# Patient Record
Sex: MALE | Race: WHITE | NOT HISPANIC OR LATINO
[De-identification: names, ages, dates, MRNs, and addresses within clinical notes are randomized per-mention and may not be internally consistent; named-entity substitution may affect disease eponyms.]

---

## 2017-03-31 ENCOUNTER — APPOINTMENT (OUTPATIENT)
Dept: CARDIOLOGY | Facility: CLINIC | Age: 37
End: 2017-03-31

## 2017-04-05 ENCOUNTER — OTHER (OUTPATIENT)
Age: 37
End: 2017-04-05

## 2017-04-25 ENCOUNTER — APPOINTMENT (OUTPATIENT)
Dept: CARDIOLOGY | Facility: CLINIC | Age: 37
End: 2017-04-25

## 2017-05-03 ENCOUNTER — NON-APPOINTMENT (OUTPATIENT)
Age: 37
End: 2017-05-03

## 2017-05-03 ENCOUNTER — APPOINTMENT (OUTPATIENT)
Dept: CARDIOLOGY | Facility: CLINIC | Age: 37
End: 2017-05-03

## 2017-05-03 VITALS
SYSTOLIC BLOOD PRESSURE: 133 MMHG | DIASTOLIC BLOOD PRESSURE: 81 MMHG | OXYGEN SATURATION: 97 % | WEIGHT: 167 LBS | HEART RATE: 67 BPM | HEIGHT: 67 IN | BODY MASS INDEX: 26.21 KG/M2

## 2017-05-03 DIAGNOSIS — R07.9 CHEST PAIN, UNSPECIFIED: ICD-10-CM

## 2017-05-09 ENCOUNTER — FORM ENCOUNTER (OUTPATIENT)
Age: 37
End: 2017-05-09

## 2017-05-10 ENCOUNTER — OUTPATIENT (OUTPATIENT)
Dept: OUTPATIENT SERVICES | Facility: HOSPITAL | Age: 37
LOS: 1 days | End: 2017-05-10
Payer: SELF-PAY

## 2017-05-10 ENCOUNTER — APPOINTMENT (OUTPATIENT)
Dept: CARDIOLOGY | Facility: CLINIC | Age: 37
End: 2017-05-10

## 2017-05-10 DIAGNOSIS — R07.9 CHEST PAIN, UNSPECIFIED: ICD-10-CM

## 2017-05-10 PROCEDURE — 75574 CT ANGIO HRT W/3D IMAGE: CPT | Mod: 26

## 2017-05-10 PROCEDURE — 75574 CT ANGIO HRT W/3D IMAGE: CPT

## 2017-07-28 ENCOUNTER — INPATIENT (INPATIENT)
Facility: HOSPITAL | Age: 37
LOS: 0 days | Discharge: HOME | DRG: 69 | End: 2017-07-29
Attending: INTERNAL MEDICINE | Admitting: INTERNAL MEDICINE
Payer: COMMERCIAL

## 2017-07-28 VITALS
WEIGHT: 160.06 LBS | TEMPERATURE: 98 F | OXYGEN SATURATION: 100 % | HEART RATE: 88 BPM | SYSTOLIC BLOOD PRESSURE: 132 MMHG | DIASTOLIC BLOOD PRESSURE: 82 MMHG | RESPIRATION RATE: 16 BRPM

## 2017-07-28 DIAGNOSIS — I63.9 CEREBRAL INFARCTION, UNSPECIFIED: ICD-10-CM

## 2017-07-28 LAB
ALBUMIN SERPL ELPH-MCNC: 4.5 G/DL — SIGNIFICANT CHANGE UP (ref 3.3–5)
ALP SERPL-CCNC: 77 U/L — SIGNIFICANT CHANGE UP (ref 40–120)
ALT FLD-CCNC: 118 U/L — HIGH (ref 12–78)
ANION GAP SERPL CALC-SCNC: 10 MMOL/L — SIGNIFICANT CHANGE UP (ref 5–17)
AST SERPL-CCNC: 219 U/L — HIGH (ref 15–37)
BASOPHILS # BLD AUTO: 0.1 K/UL — SIGNIFICANT CHANGE UP (ref 0–0.2)
BASOPHILS NFR BLD AUTO: 1.1 % — SIGNIFICANT CHANGE UP (ref 0–2)
BILIRUB SERPL-MCNC: 0.8 MG/DL — SIGNIFICANT CHANGE UP (ref 0.2–1.2)
BUN SERPL-MCNC: 16 MG/DL — SIGNIFICANT CHANGE UP (ref 7–23)
CALCIUM SERPL-MCNC: 9.2 MG/DL — SIGNIFICANT CHANGE UP (ref 8.5–10.1)
CHLORIDE SERPL-SCNC: 98 MMOL/L — SIGNIFICANT CHANGE UP (ref 96–108)
CO2 SERPL-SCNC: 28 MMOL/L — SIGNIFICANT CHANGE UP (ref 22–31)
CREAT SERPL-MCNC: 1 MG/DL — SIGNIFICANT CHANGE UP (ref 0.5–1.3)
EOSINOPHIL # BLD AUTO: 0.1 K/UL — SIGNIFICANT CHANGE UP (ref 0–0.5)
EOSINOPHIL NFR BLD AUTO: 1.6 % — SIGNIFICANT CHANGE UP (ref 0–6)
GLUCOSE SERPL-MCNC: 90 MG/DL — SIGNIFICANT CHANGE UP (ref 70–99)
HCT VFR BLD CALC: 46.2 % — SIGNIFICANT CHANGE UP (ref 39–50)
HGB BLD-MCNC: 15.5 G/DL — SIGNIFICANT CHANGE UP (ref 13–17)
LYMPHOCYTES # BLD AUTO: 1.3 K/UL — SIGNIFICANT CHANGE UP (ref 1–3.3)
LYMPHOCYTES # BLD AUTO: 25.6 % — SIGNIFICANT CHANGE UP (ref 13–44)
MCHC RBC-ENTMCNC: 31.1 PG — SIGNIFICANT CHANGE UP (ref 27–34)
MCHC RBC-ENTMCNC: 33.5 GM/DL — SIGNIFICANT CHANGE UP (ref 32–36)
MCV RBC AUTO: 92.9 FL — SIGNIFICANT CHANGE UP (ref 80–100)
MONOCYTES # BLD AUTO: 0.4 K/UL — SIGNIFICANT CHANGE UP (ref 0–0.9)
MONOCYTES NFR BLD AUTO: 7.1 % — SIGNIFICANT CHANGE UP (ref 1–9)
NEUTROPHILS # BLD AUTO: 3.3 K/UL — SIGNIFICANT CHANGE UP (ref 1.8–7.4)
NEUTROPHILS NFR BLD AUTO: 64.6 % — SIGNIFICANT CHANGE UP (ref 43–77)
PCP SPEC-MCNC: SIGNIFICANT CHANGE UP
PLATELET # BLD AUTO: 177 K/UL — SIGNIFICANT CHANGE UP (ref 150–400)
POTASSIUM SERPL-MCNC: 4 MMOL/L — SIGNIFICANT CHANGE UP (ref 3.5–5.3)
POTASSIUM SERPL-SCNC: 4 MMOL/L — SIGNIFICANT CHANGE UP (ref 3.5–5.3)
PROT SERPL-MCNC: 7.6 G/DL — SIGNIFICANT CHANGE UP (ref 6–8.3)
RBC # BLD: 4.98 M/UL — SIGNIFICANT CHANGE UP (ref 4.2–5.8)
RBC # FLD: 11.6 % — SIGNIFICANT CHANGE UP (ref 10.3–14.5)
SODIUM SERPL-SCNC: 136 MMOL/L — SIGNIFICANT CHANGE UP (ref 135–145)
TROPONIN I SERPL-MCNC: <.015 NG/ML — SIGNIFICANT CHANGE UP (ref 0.01–0.04)
WBC # BLD: 5.2 K/UL — SIGNIFICANT CHANGE UP (ref 3.8–10.5)
WBC # FLD AUTO: 5.2 K/UL — SIGNIFICANT CHANGE UP (ref 3.8–10.5)

## 2017-07-28 PROCEDURE — 70496 CT ANGIOGRAPHY HEAD: CPT | Mod: 26

## 2017-07-28 PROCEDURE — 99291 CRITICAL CARE FIRST HOUR: CPT

## 2017-07-28 PROCEDURE — 93880 EXTRACRANIAL BILAT STUDY: CPT | Mod: 26

## 2017-07-28 PROCEDURE — 93010 ELECTROCARDIOGRAM REPORT: CPT

## 2017-07-28 PROCEDURE — 70549 MR ANGIOGRAPH NECK W/O&W/DYE: CPT | Mod: 26

## 2017-07-28 PROCEDURE — 99285 EMERGENCY DEPT VISIT HI MDM: CPT

## 2017-07-28 PROCEDURE — 70551 MRI BRAIN STEM W/O DYE: CPT | Mod: 26

## 2017-07-28 PROCEDURE — 99223 1ST HOSP IP/OBS HIGH 75: CPT | Mod: AI

## 2017-07-28 RX ORDER — ACETAMINOPHEN 500 MG
650 TABLET ORAL EVERY 6 HOURS
Qty: 0 | Refills: 0 | Status: DISCONTINUED | OUTPATIENT
Start: 2017-07-28 | End: 2017-07-29

## 2017-07-28 RX ORDER — ASPIRIN/CALCIUM CARB/MAGNESIUM 324 MG
81 TABLET ORAL
Qty: 0 | Refills: 0 | Status: DISCONTINUED | OUTPATIENT
Start: 2017-07-29 | End: 2017-07-29

## 2017-07-28 RX ORDER — ALTEPLASE 100 MG
58.5 KIT INTRAVENOUS ONCE
Qty: 0 | Refills: 0 | Status: COMPLETED | OUTPATIENT
Start: 2017-07-28 | End: 2017-07-28

## 2017-07-28 RX ORDER — ACETAMINOPHEN 500 MG
650 TABLET ORAL ONCE
Qty: 0 | Refills: 0 | Status: COMPLETED | OUTPATIENT
Start: 2017-07-28 | End: 2017-07-28

## 2017-07-28 RX ORDER — ATORVASTATIN CALCIUM 80 MG/1
40 TABLET, FILM COATED ORAL AT BEDTIME
Qty: 0 | Refills: 0 | Status: DISCONTINUED | OUTPATIENT
Start: 2017-07-28 | End: 2017-07-29

## 2017-07-28 RX ORDER — ALTEPLASE 100 MG
6.5 KIT INTRAVENOUS ONCE
Qty: 0 | Refills: 0 | Status: COMPLETED | OUTPATIENT
Start: 2017-07-28 | End: 2017-07-28

## 2017-07-28 RX ADMIN — Medication 650 MILLIGRAM(S): at 11:27

## 2017-07-28 RX ADMIN — ATORVASTATIN CALCIUM 40 MILLIGRAM(S): 80 TABLET, FILM COATED ORAL at 21:49

## 2017-07-28 RX ADMIN — Medication 650 MILLIGRAM(S): at 16:33

## 2017-07-28 RX ADMIN — Medication 650 MILLIGRAM(S): at 17:30

## 2017-07-28 RX ADMIN — ALTEPLASE 58.5 MILLIGRAM(S): KIT at 10:10

## 2017-07-28 RX ADMIN — ALTEPLASE 390 MILLIGRAM(S): KIT at 10:09

## 2017-07-28 RX ADMIN — Medication 650 MILLIGRAM(S): at 12:21

## 2017-07-28 NOTE — PROGRESS NOTE ADULT - SUBJECTIVE AND OBJECTIVE BOX
BRIEF HOSPITAL COURSE: Patient is a 36y old  Male who presents with a chief complaint of Altered speech     ed (28 Jul 2017 11:50)    HPI:  37 yo white male with no sig PMHx with acute onset of altered speech shortly prior to evaluation in ED here. Was well and normal up until approximately 8:30 this morning. When he arrived at work, felt numbness of Rt side of face and RUE, followed by dysarthria. He was immediately brought to ED and evaluated by ED attending and Neurologist. CT head was neg for acute ICH/IVH and recieved tPA. All labs and tests reviewed. d/w family and Pt received tPA infusion.  Pt recently had full cardiac w/u at ? Dr. Elder's office. Was told all normal tests except thick heart (? LVH). (28 Jul 2017 11:50)      Events last 24 hours: ICU observation s/p tPA/stroke protocol. patient did well.    PAST MEDICAL & SURGICAL HISTORY:  No pertinent past medical history  No significant past surgical history      Review of Systems:  CONSTITUTIONAL: No fever, chills, or fatigue  EYES: No eye pain, visual disturbances, or discharge  ENMT:  No difficulty hearing, tinnitus, vertigo; No sinus or throat pain  NECK: No pain or stiffness  RESPIRATORY: No cough, wheezing, chills or hemoptysis; No shortness of breath  CARDIOVASCULAR: No chest pain, palpitations, dizziness, or leg swelling  GASTROINTESTINAL: No abdominal or epigastric pain. No nausea, vomiting, or hematemesis; No diarrhea or constipation. No melena or hematochezia.  GENITOURINARY: No dysuria, frequency, hematuria, or incontinence  NEUROLOGICAL: No headaches, memory loss, loss of strength, numbness, or tremors  SKIN: No itching, burning, rashes, or lesions   MUSCULOSKELETAL: No joint pain or swelling; No muscle, back, or extremity pain  PSYCHIATRIC: No depression, anxiety, mood swings, or difficulty sleeping      Medications:  acetaminophen   Tablet. 650 milliGRAM(s) Oral every 6 hours PRN  aspirin enteric coated 81 milliGRAM(s) Oral <User Schedule>  atorvastatin 40 milliGRAM(s) Oral at bedtime    ICU Vital Signs Last 24 Hrs  T(C): 36.7 (29 Jul 2017 04:01), Max: 37.1 (28 Jul 2017 20:00)  T(F): 98 (29 Jul 2017 04:01), Max: 98.7 (28 Jul 2017 20:00)  HR: 56 (29 Jul 2017 06:00) (56 - 88)  BP: 108/64 (29 Jul 2017 06:00) (93/54 - 138/72)  BP(mean): 81 (29 Jul 2017 06:00) (66 - 92)  ABP: --  ABP(mean): --  RR: 8 (29 Jul 2017 06:00) (8 - 40)  SpO2: 96% (29 Jul 2017 06:00) (96% - 100%)          I&O's Detail    28 Jul 2017 07:01  -  29 Jul 2017 07:00  --------------------------------------------------------  IN:  Total IN: 0 mL    OUT:    Voided: 1900 mL  Total OUT: 1900 mL    Total NET: -1900 mL    LABS:                        15.4   5.1   )-----------( 198      ( 29 Jul 2017 06:42 )             44.4     07-29    141  |  105  |  10  ----------------------------<  90  3.7   |  27  |  0.91    Ca    8.4<L>      29 Jul 2017 06:42  Mg     2.4     07-29    TPro  x   /  Alb  3.8  /  TBili  x   /  DBili  x   /  AST  x   /  ALT  x   /  AlkPhos  x   07-29      CARDIAC MARKERS ( 28 Jul 2017 09:53 )  <.015 ng/mL / x     / x     / x     / x          CULTURES:  Physical Examination:  General: No acute distress.  Alert, oriented, interactive, nonfocal neurologic exam. able to repeat, speech is clear and fluent, no motor drift.    HEENT: Pupils equal, reactive to light.  Symmetric, EOMI, no visual field deficit    PULM: Clear to auscultation bilaterally, no significant sputum production    CVS: Regular rate and rhythm, no murmurs, rubs, or gallops    ABD: Soft, nondistended, nontender, normoactive bowel sounds, no masses    EXT: No edema, nontender    SKIN: Warm and well perfused, no rashes noted.    RADIOLOGY: < from: MRI Head w/o Cont (07.28.17 @ 14:00) >    EXAM:  MRA NECK W & W O CONTRAST                          EXAM:  MRI BRAIN W O CONTRAST                            PROCEDURE DATE:  07/28/2017          INTERPRETATION:  .    CLINICAL INFORMATION: Acute aphasia. Cerebrovascular accident.    TECHNIQUE: Multiplanar multi sequential MRI examination of the brain was   performed without the administration of IV gadolinium. MRA images through   the neck were obtained using a combination of 2-D and 3-D time-of-flight   acquisition. Post contrast MR angiography of the neck was also performed.   The data was then reformatted into a volumetric data set and reviewed as   rotational MIP images. 7.5 cc's of IV Gadavist was administered without   immediate complication. 0 cc's was discarded.    COMPARISON: There are no comparison MRIs. Comparison is made to a prior   noncontrast head CT examination from earlier today. Comparison is also   made to a prior CT angiogram examination of the head from earlier today.    FINDINGS:    MRI Brain: The brain parenchyma is normal in signal and morphology. There   is no evidence of acute ischemia.    Ventricular size and configuration is unremarkable. Flow-voids are noted   throughout the major intracranial vessels, on the T2 weighted images,   consistent with their patency. The sella turcica and posterior fossa are   unremarkable.    The paranasal sinuses and mastoid air cells are clear. The orbits appear   unremarkable. Calvarial signal is within normal limits.    MRA Neck: There is an incidental bovine configuration to the aortic arch   which is an anatomic variation. The origins of the left subclavian artery   and right common trunk appear unremarkable. The bilateral common carotid   arteries are normal in course and caliber.    The bilateral carotid bifurcations appear unremarkable. The bilateral   cervical internal carotid arteries appear unremarkable.    The origins of the bilateral vertebral arteries are within normal limits.   The left cervical vertebral artery is dominant compared to the right. The   bilateral cervical vertebral arteries are normal in course and caliber.    IMPRESSION:    MRI BRAIN: No evidence of acute ischemia.    MRA neck: Unremarkable examination.    < end of copied text >    impression  TIA vs complicated migraine    neuro  continue Q1 hour neurologic assessment  complete resolution of symptoms  HA resolved  statin initiated  CT head negative  CTA head/neck negative  MR brain MRI/A head and neck all negative  carotid dopplers complete  urine toxicology screening negative    cardio  patient reports recent cardiovascular workup negative  goal SBP   continuous cardiac monitoring for 24h    pulm  goal O2 sats >93%  pulmonary toilet    GI  diet as tolerated  bowel regimen  mild transaminitis, trend and f/u as outpatient    renal  IVF x 24h then H/L  follow I/O  trend BUN/creat s/p IV contrast  mild hyponatremia    heme  begin chemoprophylaxis 24h after tPA administration  SCD's for DVT prophylaxis                    CRITICAL CARE TIME SPENT: 34 minutes

## 2017-07-28 NOTE — ED ADULT NURSE NOTE - ED STAT RN HANDOFF DETAILS 2
Patient now able to state full name and date of birth.  He is also able to state the name of the eyeglasses that he could not name when he arrived.

## 2017-07-28 NOTE — CONSULT NOTE ADULT - ATTENDING COMMENTS
35 yo M with prior Hx of chest pain but extensive cardiac evaluation negative, admitted this am with R arm and face paresthesias followed by expressive aphasia, concerning for CVA s/p tPA at 10am. Negative CTH, CTA, and MRI thus far.  DDx also includes atypical migraine or seizure.      --complete stroke eval with echo, carotid dopplars  check lipid panel, TFTs, tox screen  tele monitoring  clear by speech eval for regular diet  PT eval at 24h  start statin tonight and ASA tomorrow at noon  --HA beginning in ED, no evidence of hemorrhage on MRI, maybe atypical migraine, tylenol prn  --transaminitis, check hepatitis panel, trend  --no DVT ppx until 24h post tPA  --discussed plan with pt and family, Dr. Sim, Dr. Currie  --pt critically ill. CC time 40min

## 2017-07-28 NOTE — H&P ADULT - HISTORY OF PRESENT ILLNESS
35 yo white male with acute onset of altered speech shortly prior to evaluation in ED here. Was well and normal up until approximately 8:30 this morning. When he arrived at work, felt numbness of Rt side od face and Rt UE, followed by difficulty in speech. He was immediately brought to ED and evaluated by ED attending and Neurologist. CT head wad neg for any ac pathology and decision was made to give TPA. All labs and tests reviewed 37 yo white male with no sig PMHx with acute onset of altered speech shortly prior to evaluation in ED here. Was well and normal up until approximately 8:30 this morning. When he arrived at work, felt numbness of Rt side of face and Rt UE, followed by difficulty in speech. He was immediately brought to ED and evaluated by ED attending and Neurologist. CT head wad neg for any ac pathology and decision was made to give TPA. All labs and tests reviewed. d/w family and Pt received TPA infusion.  Pt recently had full cardiac w/u at ? Dr. Elder's office. Was told all normal tests except thick heart (? LVH).

## 2017-07-28 NOTE — CONSULT NOTE ADULT - ASSESSMENT
35 yo M with no sig PMHx with acute onset of altered speech, possible TIA vs seizure vs complex migraine. Negative cardiac work up as of 5/2017; Normal TTE, exercise stress and CT coronaries with a calcium score of zero. Doubt arrythmia as primary cause  Would repeat TTE to look for source of emboli, with bubble study  Monitor on tele  Neuro evaluation  Carotid doppler  Will follow with you.

## 2017-07-28 NOTE — SWALLOW BEDSIDE ASSESSMENT ADULT - COMMENTS
Pt A+Ox4, cooperative, admitted with CVA.  Pt's swallowing function is WNL and is safely tolerating regular consistencies with thin liquids without any overt s/s of aspiration.

## 2017-07-28 NOTE — CONSULT NOTE ADULT - SUBJECTIVE AND OBJECTIVE BOX
36 year old male with pmhx nonspecific chest pain with neg w/u thus far presents to ED this morning with c/o difficulty forming sentences and right sided arm and facial numbness. Patient reports going to the gym this morning when he started to experience worsening RUE numbness and tingling. Pt left gym to drive to work when he started having R facial numbness. When he arrived at work, his brother  pt was having difficulty speaking along with numbness and brought him to ED. In the ED, found to have expressive aphasia, able to report events but difficulty using appropriate nouns and naming basic objects such as eyeglasses/pen etc. VS were stable, normotensive. CT head negative for acute intracranial hemorrhage, mass effect, or midline shift. CTA negative for vessel occlusion. Labs significant for elevated LFTs, otherwise unremarkable. Urine tox pending.  He was evaluated be neurology and given tpa, symptom to needle time < 60min.   Of note, wife states pt has history of . Was evaluated by Dr. Phelan's group with negative workup. Patient is a 36y old  Male who presents with a chief complaint of expressive aphasia and RUE numbness    HPI: 36 year old male with pmhx nonspecific chest pain with neg coronary CTA and w/u thus far presents to ED this morning with c/o difficulty forming sentences and right sided arm and facial numbness. Patient reports going to the gym this morning when he started to experience worsening RUE numbness and tingling. Pt left gym to drive to work when he started having R facial numbness. When he arrived at work, his brother  pt was having difficulty speaking along with numbness and brought him to ED. In the ED, found to have expressive aphasia, able to report events but difficulty using appropriate nouns and naming basic objects such as eyeglasses/pen etc. Complains of frontal headache. Denies CP, SOB, abd pain, N/V/D/C, fever, chills, weakness.  etoh use, denies substance abuse, non smoker.  VS were stable, normotensive. CT head negative for acute intracranial hemorrhage, mass effect, or midline shift. CTA negative for vessel occlusion. Labs significant for elevated LFTs, otherwise unremarkable. Urine tox pending.  He was evaluated be neurology and given tpa, symptom to needle time < 60min.  Allergies: No Known Allergies    PAST MEDICAL & SURGICAL HISTORY:  No pertinent past medical history  No significant past surgical history    FAMILY HISTORY: Dad- CAD s/p PCI    HOME MEDICATIONS: None    REVIEW OF SYSTEMS  Constitutional: No fever, chills, fatigue  Neuro: + headache and numbness. No weakness  Resp: No cough, wheezing, shortness of breath  CVS: No chest pain, palpitations, leg swelling  GI: No abdominal pain, nausea, vomiting, diarrhea   : No dysuria, frequency, incontinence  Skin: No itching, burning, rashes, or lesions   Msk: No joint pain or swelling  Psych: No depression, anxiety, mood swings    T(F): 98.1 (07-28-17 @ 09:17), Max: 98.1 (07-28-17 @ 09:17)  HR: 74 (07-28-17 @ 11:40) (74 - 88)  BP: 120/60 (07-28-17 @ 11:40) (120/60 - 138/72)  RR: 14 (07-28-17 @ 11:40) (14 - 16)  SpO2: 99% (07-28-17 @ 11:40) (99% - 100%)      PHYSICAL EXAM  General: Well developed, well nourished, NAD  HEENT: NCAT, PERRLA, EOMI bl, moist mucous membranes   Neck: Supple, nontender, no mass  Neurology: A&Ox3, improving expressive aphasia, CN II-XII grossly intact, sensation intact  Respiratory: CTA B/L, No W/R/R  CV: RRR, +S1/S2, no murmurs, rubs or gallops  Abdominal: Soft, NT, ND +BSx4  Extremities: No C/C/E, + peripheral pulses  MSK: no joint erythema or warmth, no joint swelling   Skin: warm, dry, normal color, no rash or abnormal lesions    MEDICATIONS  alteplase    Bolus IV Bolus  alteplase    IVPB IV Intermittent       LABS                     15.5   5.2   )-----------( 177      ( 28 Jul 2017 09:53 )             46.2     07-28    136  |  98  |  16  ----------------------------<  90  4.0   |  28  |  1.00    Ca    9.2      28 Jul 2017 09:53  TPro  7.6  /  Alb  4.5  /  TBili  0.8  /  DBili  x   /  AST  219<H>  /  ALT  118<H>  /  AlkPhos  77  07-28  CARDIAC MARKERS ( 28 Jul 2017 09:53 )  <.015 ng/mL / x     / x     / x     / x          CODE STATUS: FULL  [facility] discussion: DAVINA Patient is a 36y old  Male who presents with a chief complaint of expressive aphasia and RUE numbness    HPI: 36 year old male with pmhx nonspecific chest pain with neg coronary CTA and w/u thus far presents to ED this morning with c/o difficulty forming sentences and right sided arm and facial numbness. Patient reports going to the gym this morning when he started to experience worsening RUE numbness and tingling. Pt left gym to drive to work when he started having R facial numbness. When he arrived at work, his brother  pt was having difficulty speaking along with numbness and brought him to ED. In the ED, found to have expressive aphasia, able to report events but difficulty using appropriate nouns and naming basic objects such as eyeglasses/pen etc. Complains of frontal headache. Denies CP, SOB, abd pain, N/V/D/C, fever, chills, weakness.  etoh use, denies substance abuse, non smoker.  VS were stable, normotensive. CT head negative for acute intracranial hemorrhage, mass effect, or midline shift. CTA negative for vessel occlusion. Labs significant for elevated LFTs, otherwise unremarkable. Urine tox pending.  He was evaluated be neurology and given tpa, symptom to needle time < 60min.  Allergies: No Known Allergies    PAST MEDICAL & SURGICAL HISTORY:  No pertinent past medical history  No significant past surgical history    FAMILY HISTORY: Dad- CAD s/p PCI    HOME MEDICATIONS: None    REVIEW OF SYSTEMS  Constitutional: No fever, chills, fatigue  Neuro: + headache and numbness. No weakness  Resp: No cough, wheezing, shortness of breath  CVS: No chest pain, palpitations, leg swelling  GI: No abdominal pain, nausea, vomiting, diarrhea   : No dysuria, frequency, incontinence  Skin: No itching, burning, rashes, or lesions   Msk: No joint pain or swelling  Psych: No depression, anxiety, mood swings    T(F): 98.1 (07-28-17 @ 09:17), Max: 98.1 (07-28-17 @ 09:17)  HR: 74 (07-28-17 @ 11:40) (74 - 88)  BP: 120/60 (07-28-17 @ 11:40) (120/60 - 138/72)  RR: 14 (07-28-17 @ 11:40) (14 - 16)  SpO2: 99% (07-28-17 @ 11:40) (99% - 100%)      PHYSICAL EXAM  General: Well developed, well nourished, NAD  HEENT: NCAT, PERRLA, EOMI bl, moist mucous membranes   Neck: Supple, nontender, no mass  Neurology: A&Ox3, improving expressive aphasia, CN II-XII grossly intact, mild decreased sensation in RUE, improving   Respiratory: CTA B/L, No W/R/R  CV: RRR, +S1/S2, no murmurs, rubs or gallops  Abdominal: Soft, NT, ND +BSx4  Extremities: No C/C/E, + peripheral pulses  MSK: no joint erythema or warmth, no joint swelling   Skin: warm, dry, normal color, no rash or abnormal lesions    MEDICATIONS  alteplase    Bolus IV Bolus  alteplase    IVPB IV Intermittent       LABS                     15.5   5.2   )-----------( 177      ( 28 Jul 2017 09:53 )             46.2     07-28    136  |  98  |  16  ----------------------------<  90  4.0   |  28  |  1.00    Ca    9.2      28 Jul 2017 09:53  TPro  7.6  /  Alb  4.5  /  TBili  0.8  /  DBili  x   /  AST  219<H>  /  ALT  118<H>  /  AlkPhos  77  07-28  CARDIAC MARKERS ( 28 Jul 2017 09:53 )  <.015 ng/mL / x     / x     / x     / x          CODE STATUS: FULL  [facility] discussion: Y Patient is a 36y old  Male who presents with a chief complaint of expressive aphasia and RUE numbness    HPI: 36 year old male with pmhx nonspecific chest pain with neg coronary CTA and w/u thus far presents to ED this morning with c/o difficulty forming sentences and right sided arm and facial numbness. Patient reports going to the gym this morning when he started to experience worsening RUE numbness and tingling. Pt left gym to drive to work when he started having R facial numbness. When he arrived at work, his brother  pt was having difficulty speaking along with numbness and brought him to ED. In the ED, found to have expressive aphasia, able to report events but difficulty using appropriate nouns and naming basic objects such as eyeglasses/pen etc. Complains of frontal headache. Denies CP, SOB, abd pain, N/V/D/C, fever, chills, weakness.  etoh use, denies substance abuse, non smoker.  VS were stable, normotensive. CT head negative for acute intracranial hemorrhage, mass effect, or midline shift. CTA negative for vessel occlusion. Labs significant for elevated LFTs, otherwise unremarkable. Urine tox pending.  He was evaluated be neurology and given tpa, symptom to needle time < 60min.  Allergies: No Known Allergies    PAST MEDICAL & SURGICAL HISTORY:  No pertinent past medical history  No significant past surgical history    FAMILY HISTORY: Dad- CAD s/p PCI    HOME MEDICATIONS: None    REVIEW OF SYSTEMS  Constitutional: No fever, chills, fatigue  Neuro: + headache and numbness. No weakness  Resp: No cough, wheezing, shortness of breath  CVS: No chest pain, palpitations, leg swelling  GI: No abdominal pain, nausea, vomiting, diarrhea   : No dysuria, frequency, incontinence  Skin: No itching, burning, rashes, or lesions   Msk: No joint pain or swelling  Psych: No depression, anxiety, mood swings    T(F): 98.1 (07-28-17 @ 09:17), Max: 98.1 (07-28-17 @ 09:17)  HR: 74 (07-28-17 @ 11:40) (74 - 88)  BP: 120/60 (07-28-17 @ 11:40) (120/60 - 138/72)  RR: 14 (07-28-17 @ 11:40) (14 - 16)  SpO2: 99% (07-28-17 @ 11:40) (99% - 100%)      PHYSICAL EXAM  General: Well developed, well nourished, NAD  HEENT: NCAT, PERRLA, EOMI bl, moist mucous membranes   Neck: Supple, nontender, no mass  Neurology: A&Ox3, improving expressive aphasia, CN II-XII grossly intact, mild decreased sensation in RUE, improving   Respiratory: CTA B/L, No W/R/R  CV: RRR, +S1/S2, no murmurs, rubs or gallops  Abdominal: Soft, NT, ND +BSx4  Extremities: No C/C/E, + peripheral pulses  MSK: no joint erythema or warmth, no joint swelling   Skin: warm, dry, normal color, no rash or abnormal lesions    MEDICATIONS  alteplase    Bolus IV Bolus  alteplase    IVPB IV Intermittent       LABS                     15.5   5.2   )-----------( 177      ( 28 Jul 2017 09:53 )             46.2     07-28    136  |  98  |  16  ----------------------------<  90  4.0   |  28  |  1.00    Ca    9.2      28 Jul 2017 09:53  TPro  7.6  /  Alb  4.5  /  TBili  0.8  /  DBili  x   /  AST  219<H>  /  ALT  118<H>  /  AlkPhos  77  07-28  CARDIAC MARKERS ( 28 Jul 2017 09:53 )  <.015 ng/mL / x     / x     / x     / x          CODE STATUS: FULL

## 2017-07-28 NOTE — STROKE CODE NOTE - NIH STROKE SCALE: 9. BEST LANGUAGE, QM
(1) Mild-to-moderate aphasia; some obvious loss of fluency or facility of comprehension, without significant limitation on ideas expressed or form of expression. Reduction of speech and/or comprehension, however, makes conversation about provided material difficult or impossible. For example, in conversation about provided materials, examiner can identify picture or naming card content from patient's response.
(2) Severe aphasia; all communication is through fragmentary expression; great need for inference, questioning, and guessing by the listener. Range of information that can be exchanged is limited; listener carries burden of communication. Examiner cannot identify materials provided from patient response.

## 2017-07-28 NOTE — ED ADULT NURSE NOTE - ED STAT RN HANDOFF DETAILS
6.5 mg IV push at 1009 by Dr. Portillo.  Infusion 58.5 mg. initiated at 1010 TPA 6.5 mg IV push at 1009 by Dr. Portillo.  Infusion TPA  58.5 mg. initiated at 1010

## 2017-07-28 NOTE — ED PROVIDER NOTE - OBJECTIVE STATEMENT
37 yo white male with acute onset of altered speech shortly prior to evaluation here. Was well and normal up until approximately 8:30 this morning.

## 2017-07-28 NOTE — PATIENT PROFILE ADULT. - FUNCTIONAL SCREEN CURRENT LEVEL: SWALLOWING (IF SCORE 2 OR MORE FOR ANY ITEM, CONSULT REHAB SERVICES), MLM)
(0) swallows foods/liquids without difficulty/NPO now; as per Rhonda Luis in ED, pt passed dysphagia screen; pt was drinking ginger ale while being wheeled to the ICU unit

## 2017-07-28 NOTE — H&P ADULT - PROBLEM SELECTOR PLAN 1
Pt was evaluated by Neuro Dr. Michele. and recommended TPA  Admit to ICU.  C/W IVF. No ASA, A/C for 24 hrs  Neuro check q 1 hr  s/s eval, Pt had some water in ED without difficulty.  Monitor vitals closely.  labs check Chol, TSH, urine tox  Echo.  cardio consult, and to get all results from his w/u

## 2017-07-28 NOTE — CONSULT NOTE ADULT - SUBJECTIVE AND OBJECTIVE BOX
[facility] - Morenita Elder, Tapan, Waleska, Arslan Kidd  Office Number: [phone]    Initial Consult Note    CHIEF COMPLAINT: Patient is a 36y old  Male who presents with a chief complaint of Altered speech     ed (28 Jul 2017 11:50)      HPI:  35 yo white male with no sig PMHx with acute onset of altered speech shortly prior to evaluation in ED here. Was well and normal up until approximately 8:30 this morning. When he arrived at work, felt numbness of Rt side of face and Rt UE, followed by difficulty in speech. He was immediately brought to ED and evaluated by ED attending and Neurologist. CT head wad neg for any ac pathology and decision was made to give TPA. All labs and tests reviewed. d/w family and Pt received TPA infusion.  Pt recently had full cardiac w/u at ? Dr. Elder's office. Was told all normal tests except thick heart (? LVH). (28 Jul 2017 11:50)    Of note. Normal TTE in 2017 with normal LV function. Exercise stress test with no EKG changes suspicious for ischemia. CT coronaries with a calcium score of 0. No chest pain or trouble breathing prior to the above events. Has history of vasovagal syncope in distant past. Previously on aspirin, but currently not on any medications      PAST MEDICAL & SURGICAL HISTORY:  No pertinent past medical history  No significant past surgical history      SOCIAL HISTORY:  No tobacco, ethanol, or drug abuse.    FAMILY HISTORY:  Family history of high cholesterol (Father)  Family history of coronary artery disease in father    No family history of acute MI or sudden cardiac death.    MEDICATIONS  (STANDING):  atorvastatin 40 milliGRAM(s) Oral at bedtime    MEDICATIONS  (PRN):  acetaminophen   Tablet. 650 milliGRAM(s) Oral every 6 hours PRN Mild Pain (1 - 3)      Allergies    No Known Allergies    Intolerances        REVIEW OF SYSTEMS:    CONSTITUTIONAL:+ weakness, fevers or chills  EYES/ENT: No visual changes;  No vertigo or throat pain   NECK: No pain or stiffness  RESPIRATORY: No cough, wheezing, hemoptysis; No shortness of breath  CARDIOVASCULAR: No chest pain or palpitations  GASTROINTESTINAL: No abdominal pain. No nausea, vomiting, or hematemesis; No diarrhea or constipation. No melena or hematochezia.  GENITOURINARY: No dysuria, frequency or hematuria  NEUROLOGICAL: + numbness and weakness  SKIN: No itching or rash  All other review of systems is negative unless indicated above    VITAL SIGNS:   Vital Signs Last 24 Hrs  T(C): 36.6 (28 Jul 2017 15:49), Max: 36.7 (28 Jul 2017 09:17)  T(F): 97.8 (28 Jul 2017 15:49), Max: 98.1 (28 Jul 2017 09:17)  HR: 71 (28 Jul 2017 13:15) (70 - 88)  BP: 117/66 (28 Jul 2017 13:15) (113/62 - 138/72)  BP(mean): 84 (28 Jul 2017 13:15) (81 - 92)  RR: 14 (28 Jul 2017 13:15) (13 - 17)  SpO2: 97% (28 Jul 2017 13:15) (96% - 100%)    I&O's Summary      On Exam:    Constitutional: NAD, alert and oriented x 3  Lungs:  Non-labored, breath sounds are clear bilaterally, No wheezing, rales or rhonchi  Cardiovascular: RRR.  S1 and S2 positive.  No murmurs, rubs, gallops or clicks  Gastrointestinal: Bowel Sounds present, soft, nontender.   Lymph: No peripheral edema. No cervical lymphadenopathy.  Neurological: Alert, no focal deficits  Skin: No rashes or ulcers   Psych:  Mood & affect appropriate.    LABS: All Labs Reviewed:                        15.5   5.2   )-----------( 177      ( 28 Jul 2017 09:53 )             46.2     28 Jul 2017 09:53    136    |  98     |  16     ----------------------------<  90     4.0     |  28     |  1.00     Ca    9.2        28 Jul 2017 09:53    TPro  7.6    /  Alb  4.5    /  TBili  0.8    /  DBili  x      /  AST  219    /  ALT  118    /  AlkPhos  77     28 Jul 2017 09:53      CARDIAC MARKERS ( 28 Jul 2017 09:53 )  <.015 ng/mL / x     / x     / x     / x          Blood Culture:         RADIOLOGY:    EKG: SR, no sign of ischemia    Tele: SR, no sign of ischemia

## 2017-07-28 NOTE — CONSULT NOTE ADULT - ASSESSMENT
36 year old male with pmhx nonspecific chest pain with neg coronary CTA and w/u thus far admitted with expressive aphasia and RUE numbness likely secondary to CVA with no acute hemorrhage on CT, s/p tpa administration, now in ICU for monitoring.     Neuro:  Cardio:  Resp:  GI:  :  Heme:  ID:  Endo: 36 year old male with pmhx nonspecific chest pain with neg coronary CTA and w/u thus far admitted with expressive aphasia and RUE numbness likely secondary to TIA, no acute hemorrhage on CT, s/p tpa administration, with transaminitis, now in ICU for monitoring.     Neuro: Likely sec to TIA, resolving s/p TPA administration.  MRI/MRA unremarkable. Utox pending.  F/u TTE, carotid dopplers. F/u lipid panel and thyroid studies in am. Allow for permissive HTN (SBP < 180 s/p TPA administration).  Start lipitor 40mg nightly. Will start asa 24 hours after tpa  and continue to monitor in the ICU overnight  Cardio:  Outpt coronary CTA and workup unremarkable from 5/17. Continue cardiac workup as above, stable  Resp: stable  GI: speech and swallow eval performed, start TLC diet. Transaminitis unknown etiology, f/u CMP in am, hep panel   : stable  Heme: s/p tpa   ID: stable  Endo: f/u TSH and Hgb A1c 36 year old male with pmhx nonspecific chest pain with neg coronary CTA and w/u thus far admitted with expressive aphasia and RUE numbness likely secondary to TIA, no acute hemorrhage on CT, s/p tpa administration, with transaminitis, now in ICU for monitoring.     Neuro: Likely sec to TIA, resolving s/p TPA administration.  MRI/MRA unremarkable. Utox pending.  F/u TTE, carotid dopplers. F/u lipid panel and thyroid studies in am. Allow for permissive HTN (SBP < 180 s/p TPA administration).  Start lipitor 40mg nightly. Will start asa 24 hours after tpa  and continue to monitor in the ICU overnight  Cardio:  Outpt coronary CTA and workup unremarkable from 5/17. Continue cardiac workup as above, stable  Resp: stable  GI: speech and swallow eval performed, start TLC diet. Transaminitis with increased AST:ALT, trend LFTs, f/u hep panel and ggt in am.   : stable  Heme: s/p tpa   ID: stable  Endo: f/u TSH and Hgb A1c

## 2017-07-28 NOTE — H&P ADULT - NSHPPHYSICALEXAM_GEN_ALL_CORE
PHYSICAL EXAM:  GENERAL: NAD, well-groomed, well-developed  HEAD:  Atraumatic, Normocephalic  EYES: EOMI, PERRLA, conjunctiva and sclera clear  ENMT: No tonsillar erythema, exudates, or enlargement; Moist mucous membranes,   NECK: Supple, No JVD, Normal thyroid  HEART: Regular rate and rhythm; No murmurs, rubs, or gallops  RESPIRATORY: CTA B/L, No W/R/R  ABDOMEN: Soft, Nontender, Nondistended; Bowel sounds present  NEUROLOGY: A&Ox3, nonfocal, CN II-XII grossly intact, moving all extremities, slight decreased sensation in Rt hand and forearm                  some difficulty in finding words  EXTREMITIES:  2+ Peripheral Pulses, No clubbing, cyanosis, or edema  SKIN: warm, dry, normal color, no rash or abnormal lesions

## 2017-07-28 NOTE — H&P ADULT - FAMILY HISTORY
<<-----Click on this checkbox to enter Family History Family history of coronary artery disease in father     Father  Still living? Yes, Estimated age: Age Unknown  Family history of high cholesterol, Age at diagnosis: Age Unknown

## 2017-07-29 ENCOUNTER — TRANSCRIPTION ENCOUNTER (OUTPATIENT)
Age: 37
End: 2017-07-29

## 2017-07-29 VITALS
DIASTOLIC BLOOD PRESSURE: 58 MMHG | SYSTOLIC BLOOD PRESSURE: 110 MMHG | OXYGEN SATURATION: 97 % | HEART RATE: 65 BPM | RESPIRATION RATE: 20 BRPM

## 2017-07-29 DIAGNOSIS — G43.909 MIGRAINE, UNSPECIFIED, NOT INTRACTABLE, WITHOUT STATUS MIGRAINOSUS: ICD-10-CM

## 2017-07-29 DIAGNOSIS — G45.9 TRANSIENT CEREBRAL ISCHEMIC ATTACK, UNSPECIFIED: ICD-10-CM

## 2017-07-29 LAB
ALBUMIN SERPL ELPH-MCNC: 3.8 G/DL — SIGNIFICANT CHANGE UP (ref 3.3–5)
ALP SERPL-CCNC: 67 U/L — SIGNIFICANT CHANGE UP (ref 40–120)
ALT FLD-CCNC: 85 U/L — HIGH (ref 12–78)
ANION GAP SERPL CALC-SCNC: 9 MMOL/L — SIGNIFICANT CHANGE UP (ref 5–17)
AST SERPL-CCNC: 111 U/L — HIGH (ref 15–37)
BASOPHILS # BLD AUTO: 0 K/UL — SIGNIFICANT CHANGE UP (ref 0–0.2)
BASOPHILS NFR BLD AUTO: 0.7 % — SIGNIFICANT CHANGE UP (ref 0–2)
BILIRUB SERPL-MCNC: 0.8 MG/DL — SIGNIFICANT CHANGE UP (ref 0.2–1.2)
BUN SERPL-MCNC: 10 MG/DL — SIGNIFICANT CHANGE UP (ref 7–23)
CALCIUM SERPL-MCNC: 8.4 MG/DL — LOW (ref 8.5–10.1)
CHLORIDE SERPL-SCNC: 105 MMOL/L — SIGNIFICANT CHANGE UP (ref 96–108)
CHOLEST SERPL-MCNC: 216 MG/DL — HIGH (ref 10–199)
CO2 SERPL-SCNC: 27 MMOL/L — SIGNIFICANT CHANGE UP (ref 22–31)
CREAT SERPL-MCNC: 0.91 MG/DL — SIGNIFICANT CHANGE UP (ref 0.5–1.3)
EOSINOPHIL # BLD AUTO: 0.1 K/UL — SIGNIFICANT CHANGE UP (ref 0–0.5)
EOSINOPHIL NFR BLD AUTO: 2.8 % — SIGNIFICANT CHANGE UP (ref 0–6)
GGT SERPL-CCNC: 21 U/L — SIGNIFICANT CHANGE UP (ref 9–50)
GLUCOSE SERPL-MCNC: 90 MG/DL — SIGNIFICANT CHANGE UP (ref 70–99)
HAV IGM SER-ACNC: SIGNIFICANT CHANGE UP
HBV CORE IGM SER-ACNC: SIGNIFICANT CHANGE UP
HBV SURFACE AG SER-ACNC: SIGNIFICANT CHANGE UP
HCT VFR BLD CALC: 44.4 % — SIGNIFICANT CHANGE UP (ref 39–50)
HCV AB S/CO SERPL IA: 0.11 S/CO — SIGNIFICANT CHANGE UP
HCV AB SERPL-IMP: SIGNIFICANT CHANGE UP
HDLC SERPL-MCNC: 62 MG/DL — SIGNIFICANT CHANGE UP (ref 40–125)
HGB BLD-MCNC: 15.4 G/DL — SIGNIFICANT CHANGE UP (ref 13–17)
LIPID PNL WITH DIRECT LDL SERPL: 139 MG/DL — HIGH
LYMPHOCYTES # BLD AUTO: 1.6 K/UL — SIGNIFICANT CHANGE UP (ref 1–3.3)
LYMPHOCYTES # BLD AUTO: 31 % — SIGNIFICANT CHANGE UP (ref 13–44)
MAGNESIUM SERPL-MCNC: 2.4 MG/DL — SIGNIFICANT CHANGE UP (ref 1.6–2.6)
MCHC RBC-ENTMCNC: 32.2 PG — SIGNIFICANT CHANGE UP (ref 27–34)
MCHC RBC-ENTMCNC: 34.6 GM/DL — SIGNIFICANT CHANGE UP (ref 32–36)
MCV RBC AUTO: 93.1 FL — SIGNIFICANT CHANGE UP (ref 80–100)
MONOCYTES # BLD AUTO: 0.4 K/UL — SIGNIFICANT CHANGE UP (ref 0–0.9)
MONOCYTES NFR BLD AUTO: 7.9 % — SIGNIFICANT CHANGE UP (ref 1–9)
NEUTROPHILS # BLD AUTO: 2.9 K/UL — SIGNIFICANT CHANGE UP (ref 1.8–7.4)
NEUTROPHILS NFR BLD AUTO: 57.6 % — SIGNIFICANT CHANGE UP (ref 43–77)
PHOSPHATE SERPL-MCNC: 3.4 MG/DL — SIGNIFICANT CHANGE UP (ref 2.5–4.5)
PLATELET # BLD AUTO: 198 K/UL — SIGNIFICANT CHANGE UP (ref 150–400)
POTASSIUM SERPL-MCNC: 3.7 MMOL/L — SIGNIFICANT CHANGE UP (ref 3.5–5.3)
POTASSIUM SERPL-SCNC: 3.7 MMOL/L — SIGNIFICANT CHANGE UP (ref 3.5–5.3)
PROT SERPL-MCNC: 6.7 G/DL — SIGNIFICANT CHANGE UP (ref 6–8.3)
RBC # BLD: 4.78 M/UL — SIGNIFICANT CHANGE UP (ref 4.2–5.8)
RBC # FLD: 11.4 % — SIGNIFICANT CHANGE UP (ref 10.3–14.5)
SODIUM SERPL-SCNC: 141 MMOL/L — SIGNIFICANT CHANGE UP (ref 135–145)
T3FREE SERPL-MCNC: 2.72 PG/ML — SIGNIFICANT CHANGE UP (ref 1.8–4.6)
T4 FREE SERPL-MCNC: 1.3 NG/DL — SIGNIFICANT CHANGE UP (ref 0.9–1.8)
TOTAL CHOLESTEROL/HDL RATIO MEASUREMENT: 3.5 RATIO — SIGNIFICANT CHANGE UP (ref 3.4–9.6)
TRIGL SERPL-MCNC: 77 MG/DL — SIGNIFICANT CHANGE UP (ref 10–149)
TSH SERPL-MCNC: 1.5 UIU/ML — SIGNIFICANT CHANGE UP (ref 0.36–3.74)
WBC # BLD: 5.1 K/UL — SIGNIFICANT CHANGE UP (ref 3.8–10.5)
WBC # FLD AUTO: 5.1 K/UL — SIGNIFICANT CHANGE UP (ref 3.8–10.5)

## 2017-07-29 PROCEDURE — 93005 ELECTROCARDIOGRAM TRACING: CPT

## 2017-07-29 PROCEDURE — 80074 ACUTE HEPATITIS PANEL: CPT

## 2017-07-29 PROCEDURE — 84439 ASSAY OF FREE THYROXINE: CPT

## 2017-07-29 PROCEDURE — 83735 ASSAY OF MAGNESIUM: CPT

## 2017-07-29 PROCEDURE — 85027 COMPLETE CBC AUTOMATED: CPT

## 2017-07-29 PROCEDURE — 70450 CT HEAD/BRAIN W/O DYE: CPT

## 2017-07-29 PROCEDURE — 99233 SBSQ HOSP IP/OBS HIGH 50: CPT

## 2017-07-29 PROCEDURE — 70496 CT ANGIOGRAPHY HEAD: CPT

## 2017-07-29 PROCEDURE — 70551 MRI BRAIN STEM W/O DYE: CPT

## 2017-07-29 PROCEDURE — 99285 EMERGENCY DEPT VISIT HI MDM: CPT | Mod: 25

## 2017-07-29 PROCEDURE — A9579: CPT

## 2017-07-29 PROCEDURE — 96365 THER/PROPH/DIAG IV INF INIT: CPT

## 2017-07-29 PROCEDURE — 99239 HOSP IP/OBS DSCHRG MGMT >30: CPT

## 2017-07-29 PROCEDURE — 80061 LIPID PANEL: CPT

## 2017-07-29 PROCEDURE — 80053 COMPREHEN METABOLIC PANEL: CPT

## 2017-07-29 PROCEDURE — 93306 TTE W/DOPPLER COMPLETE: CPT

## 2017-07-29 PROCEDURE — 93880 EXTRACRANIAL BILAT STUDY: CPT

## 2017-07-29 PROCEDURE — 84484 ASSAY OF TROPONIN QUANT: CPT

## 2017-07-29 PROCEDURE — 84443 ASSAY THYROID STIM HORMONE: CPT

## 2017-07-29 PROCEDURE — 99291 CRITICAL CARE FIRST HOUR: CPT

## 2017-07-29 PROCEDURE — 70549 MR ANGIOGRAPH NECK W/O&W/DYE: CPT

## 2017-07-29 PROCEDURE — 97161 PT EVAL LOW COMPLEX 20 MIN: CPT

## 2017-07-29 PROCEDURE — 84100 ASSAY OF PHOSPHORUS: CPT

## 2017-07-29 PROCEDURE — 93306 TTE W/DOPPLER COMPLETE: CPT | Mod: 26

## 2017-07-29 PROCEDURE — 84481 FREE ASSAY (FT-3): CPT

## 2017-07-29 PROCEDURE — 80307 DRUG TEST PRSMV CHEM ANLYZR: CPT

## 2017-07-29 PROCEDURE — 82977 ASSAY OF GGT: CPT

## 2017-07-29 RX ORDER — ASPIRIN/CALCIUM CARB/MAGNESIUM 324 MG
1 TABLET ORAL
Qty: 14 | Refills: 0 | OUTPATIENT
Start: 2017-07-29 | End: 2017-08-12

## 2017-07-29 RX ADMIN — Medication 650 MILLIGRAM(S): at 12:40

## 2017-07-29 RX ADMIN — Medication 650 MILLIGRAM(S): at 11:51

## 2017-07-29 RX ADMIN — Medication 81 MILLIGRAM(S): at 11:52

## 2017-07-29 NOTE — DISCHARGE NOTE ADULT - PATIENT PORTAL LINK FT
“You can access the FollowHealth Patient Portal, offered by [facility], by registering with the following website: [email or web address]”

## 2017-07-29 NOTE — DISCHARGE NOTE ADULT - CARE PLAN
Principal Discharge DX:	TIA (transient ischemic attack)  Goal:	versus complicated Migraine, cont  mg for 2 weeks then 81 mg daily, f/u with Dr matute for hypercoagulable work up.  Instructions for follow-up, activity and diet:	low cholestrol diet.  Secondary Diagnosis:	HLD (hyperlipidemia)  Goal:	as above , recheck LFT in 1 week by pcp if back to normal  start statin  Secondary Diagnosis:	LFT elevation  Goal:	recheck LFT in 1 week by pcp.

## 2017-07-29 NOTE — PHYSICAL THERAPY INITIAL EVALUATION ADULT - ANKLE STRATEGY ASSESSMENT, REHAB EVAL
pt just reports a mild "hangover" sensation when ambulating but no other deficits or c/o. Pt reports sensation in tact

## 2017-07-29 NOTE — PROGRESS NOTE ADULT - ASSESSMENT
37 yo M with no sig PMHx with acute onset of altered speech, possible TIA vs seizure vs complex migraine. Negative cardiac work up as of 5/2017; Normal TTE, exercise stress and CT coronaries with a calcium score of zero. Doubt arrythmia as primary cause  Would repeat TTE to look for source of emboli, with bubble study  Monitor on tele  Neuro follow up, evaluation appreciated  Carotid doppler with no abnormalities  MRI with no evidence of acute stroke  Will follow with you.

## 2017-07-29 NOTE — PROGRESS NOTE ADULT - SUBJECTIVE AND OBJECTIVE BOX
[facility] - Morenita Elder, Tapan, Waleska, Bernabe, Rosey Mcdaniels  Office Number:  [phone]    Patient resting comfortably in bed in NAD.  Laying flat with no respiratory distress.  No complaints of chest pain, dyspnea, palpitations, PND, or orthopnea. Neurologic symptoms have resolved.    ROS: negative unless otherwise mentioned.    Telemetry:      MEDICATIONS  (STANDING):  atorvastatin 40 milliGRAM(s) Oral at bedtime  aspirin enteric coated 81 milliGRAM(s) Oral <User Schedule>    MEDICATIONS  (PRN):  acetaminophen   Tablet. 650 milliGRAM(s) Oral every 6 hours PRN Mild Pain (1 - 3)      Allergies    No Known Allergies    Intolerances        Vital Signs Last 24 Hrs  T(C): 36.9 (29 Jul 2017 08:00), Max: 37.1 (28 Jul 2017 20:00)  T(F): 98.4 (29 Jul 2017 08:00), Max: 98.7 (28 Jul 2017 20:00)  HR: 69 (29 Jul 2017 09:00) (56 - 86)  BP: 110/65 (29 Jul 2017 09:00) (93/54 - 138/72)  BP(mean): 83 (29 Jul 2017 09:00) (66 - 92)  RR: 22 (29 Jul 2017 09:00) (8 - 40)  SpO2: 99% (29 Jul 2017 09:00) (96% - 100%)    I&O's Summary    28 Jul 2017 07:01  -  29 Jul 2017 07:00  --------------------------------------------------------  IN: 0 mL / OUT: 1900 mL / NET: -1900 mL    29 Jul 2017 07:01  -  29 Jul 2017 10:04  --------------------------------------------------------  IN: 0 mL / OUT: 400 mL / NET: -400 mL        ON EXAM:    General: NAD, awake and alert, oriented x 3  HEENT: Mucous membranes are moist, anicteric  Lungs: Non-labored, breath sounds are clear bilaterally, No wheezing, rales or rhonchi  Cardiovascular: Regular, S1 and S2, no murmurs, rubs, or gallops  Gastrointestinal: Bowel Sounds present, soft, nontender.   Lymph: No peripheral edema. No lymphadenopathy.  Skin: No rashes or ulcers  Psych:  Mood & affect appropriate    LABS: All Labs Reviewed:                        15.4   5.1   )-----------( 198      ( 29 Jul 2017 06:42 )             44.4                         15.5   5.2   )-----------( 177      ( 28 Jul 2017 09:53 )             46.2     29 Jul 2017 06:42    141    |  105    |  10     ----------------------------<  90     3.7     |  27     |  0.91   28 Jul 2017 09:53    136    |  98     |  16     ----------------------------<  90     4.0     |  28     |  1.00     Ca    8.4        29 Jul 2017 06:42  Ca    9.2        28 Jul 2017 09:53  Phos  3.4       29 Jul 2017 06:42  Mg     2.4       29 Jul 2017 06:42    TPro  6.7    /  Alb  3.8    /  TBili  0.8    /  DBili  x      /  AST  111    /  ALT  85     /  AlkPhos  67     29 Jul 2017 06:42  TPro  7.6    /  Alb  4.5    /  TBili  0.8    /  DBili  x      /  AST  219    /  ALT  118    /  AlkPhos  77     28 Jul 2017 09:53      CARDIAC MARKERS ( 28 Jul 2017 09:53 )  <.015 ng/mL / x     / x     / x     / x          Blood Culture:     07-29 @ 06:42  TSH: 1.50

## 2017-07-29 NOTE — DISCHARGE NOTE ADULT - NS AS DC STROKE ED MATERIALS
Stroke Education Booklet/Need for Followup After Discharge/Call 911 for Stroke/Prescribed Medications/Risk Factors for Stroke/Stroke Warning Signs and Symptoms

## 2017-07-29 NOTE — DISCHARGE NOTE ADULT - MEDICATION SUMMARY - MEDICATIONS TO TAKE
I will START or STAY ON the medications listed below when I get home from the hospital:    aspirin 325 mg oral tablet  -- 1 tab(s) by mouth once a day then change to 81 mg daily  -- Take with food or milk.    -- Indication: For Cerebrovascular accident (CVA), unspecified mechanism

## 2017-07-29 NOTE — PROGRESS NOTE ADULT - SUBJECTIVE AND OBJECTIVE BOX
Neurology follow up note  COVERING DR STEPHIE AMOR [id number]      Interval History:    Patient feels ok no new complaints. seen with family     MEDICATIONS    atorvastatin 40 milliGRAM(s) Oral at bedtime  aspirin enteric coated 81 milliGRAM(s) Oral <User Schedule>  acetaminophen   Tablet. 650 milliGRAM(s) Oral every 6 hours PRN      Allergies    No Known Allergies    Intolerances            Vital Signs Last 24 Hrs  T(C): 36.3 (29 Jul 2017 12:00), Max: 37.1 (28 Jul 2017 20:00)  T(F): 97.4 (29 Jul 2017 12:00), Max: 98.7 (28 Jul 2017 20:00)  HR: 66 (29 Jul 2017 13:00) (56 - 86)  BP: 103/70 (29 Jul 2017 13:00) (93/54 - 121/59)  BP(mean): 83 (29 Jul 2017 13:00) (66 - 88)  RR: 18 (29 Jul 2017 13:00) (8 - 40)  SpO2: 98% (29 Jul 2017 13:00) (96% - 100%)      REVIEW OF SYSTEMS:     Constitutional: No fever, chills, fatigue, weakness  Eyes: no eye pain, visual disturbances, or discharge  ENT:  No difficulty hearing, tinnitus, vertigo; No sinus or throat pain  Neck: No pain or stiffness  Respiratory: No cough, dyspnea, wheezing   Cardiovascular: No chest pain, palpitations,   Gastrointestinal: No abdominal or epigastric pain. No nausea, vomiting  No diarrhea or constipation.   Genitourinary: No dysuria, frequency, hematuria or incontinence  Neurological: No headaches, lightheadedness, vertigo, numbness or tremors  Psychiatric: No depression, anxiety, mood swings or difficulty sleeping  Musculoskeletal: No joint pain or swelling; No muscle, back or extremity pain  Skin: No itching, burning, rashes or lesions   Lymph Nodes: No enlarged glands  Endocrine: No heat or cold intolerance; No hair loss   Allergy and Immunologic: No hives or eczema    On Neurological Examination:    Mental Status - Patient is alert, awake, oriented X3.      Follow simple commands  Follow complex commands      Speech -   Fluent                         Cranial Nerves - Pupils 3 mm equal and reactive to light,   extraocular eye movements intact.   smile symmetric  intact bilateral NLF    Motor Exam -   Right upper 5/5  Left upper 5/5  Right lower 5/5  Left lower  5/5    Muscle tone - is normal all over.  No asymmetry is seen.      Sensory    Bilateral intact to light touch      GENERAL Exam: Nontoxic , No Acute Distress   	  HEENT:  normocephalic, atraumatic  		  LUNGS: Clear bilaterally   	  HEART: Normal S1S2   No murmur RRR        	  GI/ ABDOMEN:  Soft  Non tender    EXTREMITIES:   No Edema  No Clubbing  No Cyanosis No Edema    MUSCULOSKELETAL: Normal Range of Motion  	   SKIN: Normal  No Ecchymosis               LABS:  CBC Full  -  ( 29 Jul 2017 06:42 )  WBC Count : 5.1 K/uL  Hemoglobin : 15.4 g/dL  Hematocrit : 44.4 %  Platelet Count - Automated : 198 K/uL  Mean Cell Volume : 93.1 fl  Mean Cell Hemoglobin : 32.2 pg  Mean Cell Hemoglobin Concentration : 34.6 gm/dL  Auto Neutrophil # : 2.9 K/uL  Auto Lymphocyte # : 1.6 K/uL  Auto Monocyte # : 0.4 K/uL  Auto Eosinophil # : 0.1 K/uL  Auto Basophil # : 0.0 K/uL  Auto Neutrophil % : 57.6 %  Auto Lymphocyte % : 31.0 %  Auto Monocyte % : 7.9 %  Auto Eosinophil % : 2.8 %  Auto Basophil % : 0.7 %      07-29    141  |  105  |  10  ----------------------------<  90  3.7   |  27  |  0.91    Ca    8.4<L>      29 Jul 2017 06:42  Phos  3.4     07-29  Mg     2.4     07-29    TPro  6.7  /  Alb  3.8  /  TBili  0.8  /  DBili  x   /  AST  111<H>  /  ALT  85<H>  /  AlkPhos  67  07-29    Hemoglobin A1C:   Lipid Panel 07-29 @ 10:13  Total Cholesterol, Serum 216    Triglycerides 77    LIVER FUNCTIONS - ( 29 Jul 2017 10:13 )  Alb: x     / Pro: x     / ALK PHOS: x     / ALT: x     / AST: x     / GGT: 21 U/L       Vitamin B12         RADIOLOGY      spoke to family at bedside in detail   Physical therapy evaluation.  OOB to chair/ambulation with assistance only.  Advanced care planning was discussed with family.  Plan of care was discussed with family. Questions answered.  Would continue to follow.  30 minutes spent on total encounter; more than 50% of the visit was spent counseling and/or coordinating care by the attending physician.

## 2017-07-29 NOTE — PHYSICAL THERAPY INITIAL EVALUATION ADULT - PERTINENT HX OF CURRENT PROBLEM, REHAB EVAL
As per H&P:" 37 yo white male with no sig PMHx with acute onset of altered speech shortly prior to evaluation in ED here. Was well and normal up until approximately 8:30 this morning. When he arrived at work, felt numbness of Rt side of face and Rt UE, followed by difficulty in speech. He was immediately brought to ED and evaluated by ED attending and Neurologist. CT head wad neg for any ac pathology and decision was made to give TPA." TPA given ~ 10 am as per RN

## 2017-07-29 NOTE — PHYSICAL THERAPY INITIAL EVALUATION ADULT - ADDITIONAL COMMENTS
Pt lives in a private home /c his wife and children /c 2 JENNIFER and bilateral rails and 10 plus 10 inside /c 1/2 and than 1 rail to bedrooms and basement. Pt is fully independent /c all functional mobility and ADLs. 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 Pt does not own or use any adaptive or assistive devices.

## 2017-07-29 NOTE — DISCHARGE NOTE ADULT - HOSPITAL COURSE
35 yo white male with no sig PMHx with acute onset of altered speech shortly prior to evaluation in ED here. Was well and normal up until approximately 8:30 this morning. When he arrived at work, felt numbness of Rt side of face and Rt UE, followed by difficulty in speech. He was immediately brought to ED and evaluated by ED attending and Neurologist. CT head wad neg for any ac pathology and decision was made to give TPA. All labs and tests reviewed. d/w family and Pt received TPA infusion.  Pt recently had full cardiac w/u at ? Dr. Elder's office. Was told all normal tests except thick heart (? LVH).      PT is stable, all symptoms resolved, no neurologic dificit at this time, Echo normal, cleared by neuro to cont  mg daily for 2 weeks then 81 mg daily, f/u with Dr Michele for hyper coagulopathy w/o. MRI of neck and brain ruled out any acute CVA,   will discharge him  with diagnosis of TIA/ complicated Migraine.    Elevated transaminase, hold on Statin , recheck liver enzyme in 1 week by pcp then if back to normal can be start it.   carotid doppler normal, with antegrade  flow on vertebral artery,   there isn oofficial sutdy on echo yet but Dr Vasquez called me that she spoke to cardiologist Dr bueno who reviewed the echo and confirmed it s in normal shape.   i spent 45 min and notified Dr edgar pcp.   I saw and examined  him   PHYSICAL EXAM    Constitutional: NAD, well-groomed, well-developed  HEENT: PERRLA, EOMI, Normal Hearing, MMM  Neck: No LAD, No JVD  Back: Normal spine flexure, No CVA tenderness  Respiratory: CTAB/L   Cardiovascular: S1 and S2, RRR, no M/G/R  Gastrointestinal: BS+, soft, NT/ND  Extremities: No peripheral edema  Vascular: 2+ peripheral pulses  Neurological: A/O x 3, no focal deficits  Skin: No rashes

## 2017-07-29 NOTE — PROGRESS NOTE ADULT - ATTENDING COMMENTS
37 yo M with prior Hx of chest pain but extensive cardiac evaluation negative, admitted with TIA s/p tPA v typical migraine.  Symptoms now resolved with workup unrevealing so far.      --remainder of workup today with echo, carotid dopplars unrevealing  hyperlipidemia, continue statin   to start today  plan for outpt neurology followup within 2wks, hypercoag workup to be done at that time  since LFTs mildly elevated will not d/c on statin, at outpt followup will have repeat LFTs done and anticipate restarting statin then  this was discussed with pt, wife, father in detail  stable for d/c home today

## 2017-07-29 NOTE — PROGRESS NOTE ADULT - SUBJECTIVE AND OBJECTIVE BOX
24 hour events:   MRI/MRA negative for acute CVA    T(F): 98.4 (07-29-17 @ 08:00), Max: 98.7 (07-28-17 @ 20:00)  HR: 59 (07-29-17 @ 07:00) (56 - 88)  BP: 103/64 (07-29-17 @ 07:00) (93/54 - 138/72)  RR: 12 (07-29-17 @ 07:00) (8 - 40)  SpO2: 98% (07-29-17 @ 07:00) (96% - 100%)    I&O's Summary    28 Jul 2017 07:01  -  29 Jul 2017 07:00  --------------------------------------------------------  IN: 0 mL / OUT: 1900 mL / NET: -1900 mL    Physical Exam:   Gen:  Neuro:  HEENT:  Resp:  CVS:  Abd:  Ext:  Skin:    Meds:  atorvastatin Oral  acetaminophen   Tablet. Oral PRN  aspirin enteric coated Oral                          15.4   5.1   )-----------( 198      ( 29 Jul 2017 06:42 )             44.4       07-29    141  |  105  |  10  ----------------------------<  90  3.7   |  27  |  0.91    Ca    8.4<L>      29 Jul 2017 06:42  Phos  3.4     07-29  Mg     2.4     07-29    TPro  6.7  /  Alb  3.8  /  TBili  0.8  /  DBili  x   /  AST  111<H>  /  ALT  85<H>  /  AlkPhos  67  07-29      Radiology:   MRA Neck w/wo Cont (07.28.17 @ 14:20) >  IMPRESSION:  MRI BRAIN: No evidence of acute ischemia.  MRA neck: Unremarkable examination.    US Duplex Carotid Arteries Complete, Bilateral (07.28.17 @ 21:13) >  IMPRESSION:       Anterior and posteriorcirculation intact.  No hemodynamically significant stenosis or occlusion.  Antegrade flow noted in the vertebral arteries bilaterally.    Bedside ultrasound: ***    CENTRAL LINE: N/Y          DATE INSERTED:              REMOVE: Y/N  MELTON: N/Y                       DATE INSERTED:              REMOVE: Y/N  A-LINE: N/Y                       DATE INSERTED:              REMOVE: Y/N    GLOBAL ISSUE/BEST PRACTICE:  Analgesia:  Sedation:  CAM-ICU:   HOB elevation: yes  Stress ulcer prophylaxis:  VTE prophylaxis:  Glycemic control:  Nutrition:    CODE STATUS: *** 24 hour events:   MRI/MRA negative for acute CVA    T(F): 98.4 (07-29-17 @ 08:00), Max: 98.7 (07-28-17 @ 20:00)  HR: 59 (07-29-17 @ 07:00) (56 - 88)  BP: 103/64 (07-29-17 @ 07:00) (93/54 - 138/72)  RR: 12 (07-29-17 @ 07:00) (8 - 40)  SpO2: 98% (07-29-17 @ 07:00) (96% - 100%)    I&O's Summary    28 Jul 2017 07:01  -  29 Jul 2017 07:00  --------------------------------------------------------  IN: 0 mL / OUT: 1900 mL / NET: -1900 mL    Physical Exam:   Gen: well appearing  Neuro: A and O x3  HEENT:PERRL  Resp: CTABL  CVS: RRR  Abd: soft, NTND  Ext: no edema    Meds:  atorvastatin Oral  acetaminophen   Tablet. Oral PRN  aspirin enteric coated Oral                          15.4   5.1   )-----------( 198      ( 29 Jul 2017 06:42 )             44.4       07-29    141  |  105  |  10  ----------------------------<  90  3.7   |  27  |  0.91    Ca    8.4<L>      29 Jul 2017 06:42  Phos  3.4     07-29  Mg     2.4     07-29    TPro  6.7  /  Alb  3.8  /  TBili  0.8  /  DBili  x   /  AST  111<H>  /  ALT  85<H>  /  AlkPhos  67  07-29      Radiology:   MRA Neck w/wo Cont (07.28.17 @ 14:20) >  IMPRESSION:  MRI BRAIN: No evidence of acute ischemia.  MRA neck: Unremarkable examination.    US Duplex Carotid Arteries Complete, Bilateral (07.28.17 @ 21:13) >  IMPRESSION:       Anterior and posteriorcirculation intact.  No hemodynamically significant stenosis or occlusion.  Antegrade flow noted in the vertebral arteries bilaterally.    CENTRAL LINE: N  MELTON: N  A-LINE: N    GLOBAL ISSUE/BEST PRACTICE:  Analgesia:  Sedation:  CAM-ICU:   HOB elevation: yes  Stress ulcer prophylaxis:  VTE prophylaxis:  Glycemic control:  Nutrition:    CODE STATUS: *** 24 hour events:   MRI/MRA negative for acute CVA  ambulating around ICU without difficulty  HA this am which improved with tylenol    T(F): 98.4 (07-29-17 @ 08:00), Max: 98.7 (07-28-17 @ 20:00)  HR: 59 (07-29-17 @ 07:00) (56 - 88)  BP: 103/64 (07-29-17 @ 07:00) (93/54 - 138/72)  RR: 12 (07-29-17 @ 07:00) (8 - 40)  SpO2: 98% (07-29-17 @ 07:00) (96% - 100%)    I&O's Summary    28 Jul 2017 07:01  -  29 Jul 2017 07:00  --------------------------------------------------------  IN: 0 mL / OUT: 1900 mL / NET: -1900 mL    Physical Exam:   Gen: well appearing  Neuro: A and O x3, fluent speech  HEENT:PERRL  Resp: CTABL  CVS: RRR  Abd: soft, NTND  Ext: no edema    Meds:  atorvastatin Oral  acetaminophen   Tablet. Oral PRN  aspirin enteric coated Oral                          15.4   5.1   )-----------( 198      ( 29 Jul 2017 06:42 )             44.4       07-29    141  |  105  |  10  ----------------------------<  90  3.7   |  27  |  0.91    Ca    8.4<L>      29 Jul 2017 06:42  Phos  3.4     07-29  Mg     2.4     07-29    TPro  6.7  /  Alb  3.8  /  TBili  0.8  /  DBili  x   /  AST  111<H>  /  ALT  85<H>  /  AlkPhos  67  07-29      Radiology:   MRA Neck w/wo Cont (07.28.17 @ 14:20) >  IMPRESSION:  MRI BRAIN: No evidence of acute ischemia.  MRA neck: Unremarkable examination.    US Duplex Carotid Arteries Complete, Bilateral (07.28.17 @ 21:13) >  IMPRESSION:       Anterior and posteriorcirculation intact.  No hemodynamically significant stenosis or occlusion.  Antegrade flow noted in the vertebral arteries bilaterally.    CENTRAL LINE: N  MELTON: N  A-LINE: N    GLOBAL ISSUE/BEST PRACTICE:  Analgesia: Y  Sedation:N  CAM-ICU: NEG  HOB elevation: yes  Stress ulcer prophylaxis:NA  VTE prophylaxis:SCD  Glycemic control: Y  Nutrition:Y    CODE STATUS: FULL

## 2017-07-29 NOTE — DISCHARGE NOTE ADULT - PLAN OF CARE
versus complicated Migraine, cont  mg for 2 weeks then 81 mg daily, f/u with Dr matute for hypercoagulable work up. low cholestrol diet. as above , recheck LFT in 1 week by pcp if back to normal  start statin recheck LFT in 1 week by pcp.

## 2017-07-29 NOTE — PHYSICAL THERAPY INITIAL EVALUATION ADULT - GENERAL OBSERVATIONS, REHAB EVAL
Pt received sitting up in chair in ICU after ambulating /c Nsg staff. Pt wife and family present. Pt agreeable /c PT eval. Pt reports just mild grogginess sensation as if a "hangover" after medication.

## 2017-07-29 NOTE — PROGRESS NOTE ADULT - PROBLEM SELECTOR PLAN 1
complicated migraine VS TIA S/P TPA   aspirin 325  statin if possible  hypercoagulable work up  follow up echo

## 2017-07-29 NOTE — PHYSICAL THERAPY INITIAL EVALUATION ADULT - DID THE PATIENT HAVE SURGERY?
n/a/CT head/brain: (-) hemorage; MRI/MRA Head: (-) pathology; CT Carotid to heart and Coronaries: (-)

## 2017-07-29 NOTE — DISCHARGE NOTE ADULT - CARE PROVIDER_API CALL
Josephine Michele ([facility] [place] [address]  Phone: [phone]  Fax: [phone]    eliu edgar  Phone: (   )    -  Fax: (   )    -

## 2017-08-02 ENCOUNTER — EMERGENCY (EMERGENCY)
Facility: HOSPITAL | Age: 37
LOS: 1 days | Discharge: HOME | End: 2017-08-02
Attending: EMERGENCY MEDICINE | Admitting: EMERGENCY MEDICINE
Payer: COMMERCIAL

## 2017-08-02 VITALS
OXYGEN SATURATION: 98 % | TEMPERATURE: 98 F | SYSTOLIC BLOOD PRESSURE: 123 MMHG | HEART RATE: 69 BPM | DIASTOLIC BLOOD PRESSURE: 81 MMHG | RESPIRATION RATE: 14 BRPM

## 2017-08-02 VITALS
SYSTOLIC BLOOD PRESSURE: 125 MMHG | OXYGEN SATURATION: 99 % | WEIGHT: 166.89 LBS | HEART RATE: 61 BPM | DIASTOLIC BLOOD PRESSURE: 77 MMHG | RESPIRATION RATE: 16 BRPM | TEMPERATURE: 98 F

## 2017-08-02 DIAGNOSIS — Z82.49 FAMILY HISTORY OF ISCHEMIC HEART DISEASE AND OTHER DISEASES OF THE CIRCULATORY SYSTEM: ICD-10-CM

## 2017-08-02 DIAGNOSIS — R51 HEADACHE: ICD-10-CM

## 2017-08-02 DIAGNOSIS — G43.109 MIGRAINE WITH AURA, NOT INTRACTABLE, WITHOUT STATUS MIGRAINOSUS: ICD-10-CM

## 2017-08-02 DIAGNOSIS — R20.0 ANESTHESIA OF SKIN: ICD-10-CM

## 2017-08-02 DIAGNOSIS — R47.01 APHASIA: ICD-10-CM

## 2017-08-02 DIAGNOSIS — Z79.82 LONG TERM (CURRENT) USE OF ASPIRIN: ICD-10-CM

## 2017-08-02 DIAGNOSIS — D68.59 OTHER PRIMARY THROMBOPHILIA: ICD-10-CM

## 2017-08-02 DIAGNOSIS — Z83.42 FAMILY HISTORY OF FAMILIAL HYPERCHOLESTEROLEMIA: ICD-10-CM

## 2017-08-02 DIAGNOSIS — G45.9 TRANSIENT CEREBRAL ISCHEMIC ATTACK, UNSPECIFIED: ICD-10-CM

## 2017-08-02 DIAGNOSIS — R74.0 NONSPECIFIC ELEVATION OF LEVELS OF TRANSAMINASE AND LACTIC ACID DEHYDROGENASE [LDH]: ICD-10-CM

## 2017-08-02 DIAGNOSIS — E87.1 HYPO-OSMOLALITY AND HYPONATREMIA: ICD-10-CM

## 2017-08-02 LAB
ALBUMIN SERPL ELPH-MCNC: 4.3 G/DL — SIGNIFICANT CHANGE UP (ref 3.3–5)
ALP SERPL-CCNC: 70 U/L — SIGNIFICANT CHANGE UP (ref 40–120)
ALT FLD-CCNC: 51 U/L — SIGNIFICANT CHANGE UP (ref 12–78)
ANION GAP SERPL CALC-SCNC: 7 MMOL/L — SIGNIFICANT CHANGE UP (ref 5–17)
AST SERPL-CCNC: 27 U/L — SIGNIFICANT CHANGE UP (ref 15–37)
BASOPHILS # BLD AUTO: 0.1 K/UL — SIGNIFICANT CHANGE UP (ref 0–0.2)
BASOPHILS NFR BLD AUTO: 1.3 % — SIGNIFICANT CHANGE UP (ref 0–2)
BILIRUB SERPL-MCNC: 0.6 MG/DL — SIGNIFICANT CHANGE UP (ref 0.2–1.2)
BUN SERPL-MCNC: 18 MG/DL — SIGNIFICANT CHANGE UP (ref 7–23)
CALCIUM SERPL-MCNC: 8.6 MG/DL — SIGNIFICANT CHANGE UP (ref 8.5–10.1)
CHLORIDE SERPL-SCNC: 103 MMOL/L — SIGNIFICANT CHANGE UP (ref 96–108)
CO2 SERPL-SCNC: 30 MMOL/L — SIGNIFICANT CHANGE UP (ref 22–31)
CREAT SERPL-MCNC: 1.1 MG/DL — SIGNIFICANT CHANGE UP (ref 0.5–1.3)
EOSINOPHIL # BLD AUTO: 0.1 K/UL — SIGNIFICANT CHANGE UP (ref 0–0.5)
EOSINOPHIL NFR BLD AUTO: 2.6 % — SIGNIFICANT CHANGE UP (ref 0–6)
GLUCOSE SERPL-MCNC: 99 MG/DL — SIGNIFICANT CHANGE UP (ref 70–99)
HCT VFR BLD CALC: 44.6 % — SIGNIFICANT CHANGE UP (ref 39–50)
HGB BLD-MCNC: 15.4 G/DL — SIGNIFICANT CHANGE UP (ref 13–17)
LYMPHOCYTES # BLD AUTO: 2 K/UL — SIGNIFICANT CHANGE UP (ref 1–3.3)
LYMPHOCYTES # BLD AUTO: 35.9 % — SIGNIFICANT CHANGE UP (ref 13–44)
MCHC RBC-ENTMCNC: 31.6 PG — SIGNIFICANT CHANGE UP (ref 27–34)
MCHC RBC-ENTMCNC: 34.5 GM/DL — SIGNIFICANT CHANGE UP (ref 32–36)
MCV RBC AUTO: 91.6 FL — SIGNIFICANT CHANGE UP (ref 80–100)
MONOCYTES # BLD AUTO: 0.4 K/UL — SIGNIFICANT CHANGE UP (ref 0–0.9)
MONOCYTES NFR BLD AUTO: 7.4 % — SIGNIFICANT CHANGE UP (ref 1–9)
NEUTROPHILS # BLD AUTO: 3 K/UL — SIGNIFICANT CHANGE UP (ref 1.8–7.4)
NEUTROPHILS NFR BLD AUTO: 52.8 % — SIGNIFICANT CHANGE UP (ref 43–77)
PLATELET # BLD AUTO: 188 K/UL — SIGNIFICANT CHANGE UP (ref 150–400)
POTASSIUM SERPL-MCNC: 3.6 MMOL/L — SIGNIFICANT CHANGE UP (ref 3.5–5.3)
POTASSIUM SERPL-SCNC: 3.6 MMOL/L — SIGNIFICANT CHANGE UP (ref 3.5–5.3)
PROT SERPL-MCNC: 7.3 G/DL — SIGNIFICANT CHANGE UP (ref 6–8.3)
RBC # BLD: 4.87 M/UL — SIGNIFICANT CHANGE UP (ref 4.2–5.8)
RBC # FLD: 11 % — SIGNIFICANT CHANGE UP (ref 10.3–14.5)
SODIUM SERPL-SCNC: 140 MMOL/L — SIGNIFICANT CHANGE UP (ref 135–145)
TROPONIN I SERPL-MCNC: <.015 NG/ML — SIGNIFICANT CHANGE UP (ref 0.01–0.04)
WBC # BLD: 5.6 K/UL — SIGNIFICANT CHANGE UP (ref 3.8–10.5)
WBC # FLD AUTO: 5.6 K/UL — SIGNIFICANT CHANGE UP (ref 3.8–10.5)

## 2017-08-02 PROCEDURE — 80053 COMPREHEN METABOLIC PANEL: CPT

## 2017-08-02 PROCEDURE — 84484 ASSAY OF TROPONIN QUANT: CPT

## 2017-08-02 PROCEDURE — 70450 CT HEAD/BRAIN W/O DYE: CPT | Mod: 26

## 2017-08-02 PROCEDURE — 99291 CRITICAL CARE FIRST HOUR: CPT | Mod: 25

## 2017-08-02 PROCEDURE — 99291 CRITICAL CARE FIRST HOUR: CPT

## 2017-08-02 PROCEDURE — 96374 THER/PROPH/DIAG INJ IV PUSH: CPT

## 2017-08-02 PROCEDURE — 96375 TX/PRO/DX INJ NEW DRUG ADDON: CPT

## 2017-08-02 PROCEDURE — 85027 COMPLETE CBC AUTOMATED: CPT

## 2017-08-02 PROCEDURE — 36415 COLL VENOUS BLD VENIPUNCTURE: CPT

## 2017-08-02 PROCEDURE — 93005 ELECTROCARDIOGRAM TRACING: CPT

## 2017-08-02 PROCEDURE — 70450 CT HEAD/BRAIN W/O DYE: CPT

## 2017-08-02 RX ORDER — KETOROLAC TROMETHAMINE 30 MG/ML
30 SYRINGE (ML) INJECTION ONCE
Qty: 0 | Refills: 0 | Status: DISCONTINUED | OUTPATIENT
Start: 2017-08-02 | End: 2017-08-02

## 2017-08-02 RX ORDER — METOCLOPRAMIDE HCL 10 MG
10 TABLET ORAL ONCE
Qty: 0 | Refills: 0 | Status: COMPLETED | OUTPATIENT
Start: 2017-08-02 | End: 2017-08-02

## 2017-08-02 RX ORDER — METOCLOPRAMIDE HCL 10 MG
10 TABLET ORAL ONCE
Qty: 0 | Refills: 0 | Status: DISCONTINUED | OUTPATIENT
Start: 2017-08-02 | End: 2017-08-02

## 2017-08-02 RX ADMIN — Medication 10 MILLIGRAM(S): at 17:52

## 2017-08-02 RX ADMIN — Medication 30 MILLIGRAM(S): at 17:52

## 2017-08-02 NOTE — ED PROVIDER NOTE - PROGRESS NOTE DETAILS
case colette Michele All results were explained to patient and/or family and a copy of all available results given.  pt is asymptomatic, no more numbness.  CT and lab results colette Schaefer, will see pt tomorrow.

## 2017-08-02 NOTE — ED PROVIDER NOTE - CRITICAL CARE PROVIDED
direct patient care (not related to procedure)/consultation with other physicians/interpretation of diagnostic studies/documentation/additional history taking

## 2017-08-02 NOTE — ED PROVIDER NOTE - MEDICAL DECISION MAKING DETAILS
right arm numbness to right chest, lab, ct, ekg, meds, ivf right arm numbness to right chest, lab, ct, ekg, meds, ivf, neuro consult

## 2017-08-02 NOTE — ED ADULT NURSE NOTE - OBJECTIVE STATEMENT
Pt c/o numbness. Pt stated numbness to right arm, tingling "pins and needle" in arm shoulder, chest and back. Pt states symptoms started 40 minutes. Pt denies change in LOC, headache, nausea, pain.  No acute s/s distress. Pt c/o numbness. Pt stated numbness to right arm, tingling "pins and needle" in arm shoulder, chest and back. Pt states symptoms started 40 minutes. Pt denies change in LOC, headache, nausea, pain.  No acute s/s distress. Pt exp'd recent TIA - TPA was administered and pt now takes 325mg aspirin daily, no neuro deficits noted, will continue to monitor

## 2017-08-03 ENCOUNTER — OTHER (OUTPATIENT)
Age: 37
End: 2017-08-03

## 2017-08-04 ENCOUNTER — NON-APPOINTMENT (OUTPATIENT)
Age: 37
End: 2017-08-04

## 2017-08-04 ENCOUNTER — APPOINTMENT (OUTPATIENT)
Dept: CARDIOLOGY | Facility: CLINIC | Age: 37
End: 2017-08-04
Payer: COMMERCIAL

## 2017-08-04 VITALS
BODY MASS INDEX: 26.21 KG/M2 | OXYGEN SATURATION: 96 % | HEART RATE: 67 BPM | HEIGHT: 67 IN | WEIGHT: 167 LBS | DIASTOLIC BLOOD PRESSURE: 70 MMHG | SYSTOLIC BLOOD PRESSURE: 113 MMHG

## 2017-08-04 PROCEDURE — 99215 OFFICE O/P EST HI 40 MIN: CPT

## 2017-08-04 PROCEDURE — 93000 ELECTROCARDIOGRAM COMPLETE: CPT

## 2017-08-06 ENCOUNTER — NON-APPOINTMENT (OUTPATIENT)
Age: 37
End: 2017-08-06

## 2017-08-08 ENCOUNTER — OTHER (OUTPATIENT)
Age: 37
End: 2017-08-08

## 2017-08-18 ENCOUNTER — APPOINTMENT (OUTPATIENT)
Dept: CV DIAGNOSITCS | Facility: HOSPITAL | Age: 37
End: 2017-08-18

## 2017-08-18 ENCOUNTER — OUTPATIENT (OUTPATIENT)
Dept: OUTPATIENT SERVICES | Facility: HOSPITAL | Age: 37
LOS: 1 days | End: 2017-08-18
Payer: COMMERCIAL

## 2017-08-18 DIAGNOSIS — G45.9 TRANSIENT CEREBRAL ISCHEMIC ATTACK, UNSPECIFIED: ICD-10-CM

## 2017-08-18 PROCEDURE — 93312 ECHO TRANSESOPHAGEAL: CPT | Mod: 26

## 2017-08-18 PROCEDURE — 93306 TTE W/DOPPLER COMPLETE: CPT | Mod: 26

## 2017-08-18 PROCEDURE — 93306 TTE W/DOPPLER COMPLETE: CPT

## 2017-08-18 PROCEDURE — 93312 ECHO TRANSESOPHAGEAL: CPT

## 2017-08-21 ENCOUNTER — OUTPATIENT (OUTPATIENT)
Dept: OUTPATIENT SERVICES | Facility: HOSPITAL | Age: 37
LOS: 1 days | End: 2017-08-21
Payer: COMMERCIAL

## 2017-08-21 VITALS
OXYGEN SATURATION: 97 % | DIASTOLIC BLOOD PRESSURE: 65 MMHG | WEIGHT: 164.91 LBS | HEIGHT: 67 IN | RESPIRATION RATE: 16 BRPM | HEART RATE: 60 BPM | SYSTOLIC BLOOD PRESSURE: 115 MMHG | TEMPERATURE: 98 F

## 2017-08-21 DIAGNOSIS — R55 SYNCOPE AND COLLAPSE: ICD-10-CM

## 2017-08-21 PROCEDURE — C1764: CPT

## 2017-08-21 PROCEDURE — 33282: CPT

## 2017-08-21 PROCEDURE — 93005 ELECTROCARDIOGRAM TRACING: CPT

## 2017-08-21 PROCEDURE — 93010 ELECTROCARDIOGRAM REPORT: CPT

## 2017-08-21 NOTE — H&P CARDIOLOGY - HISTORY OF PRESENT ILLNESS
This is a 35 yo male with PMH with recent  hx of TIA  who presents today for a loop recorder. The p[atiet denies any CP, palpitations, SOB, BAPTISTE, syncope or near syncope. Patient had RAD with below results :  < from: Transesophageal Echocardiogram (08.18.17 @ 12:10) >  onclusions:  1. Normal left atrium.  LA volume index = 27 cc/m2. No left  atrial or left atrial appendage thrombus. Normal left  atrial appendage function (velocity= (101) cm/s).  2. Normal left ventricular systolic function. No segmental  wall motion abnormalities.  3. Normal diastolic function  4. Normal right ventricular size and function.  5. Agitated saline injection and color flow Doppler  demonstrates evidence of a very small patent foramen ovale.    < end of copied text >

## 2017-08-31 ENCOUNTER — NON-APPOINTMENT (OUTPATIENT)
Age: 37
End: 2017-08-31

## 2017-08-31 ENCOUNTER — APPOINTMENT (OUTPATIENT)
Dept: ELECTROPHYSIOLOGY | Facility: CLINIC | Age: 37
End: 2017-08-31
Payer: COMMERCIAL

## 2017-08-31 VITALS — OXYGEN SATURATION: 99 % | DIASTOLIC BLOOD PRESSURE: 79 MMHG | SYSTOLIC BLOOD PRESSURE: 122 MMHG | HEART RATE: 61 BPM

## 2017-08-31 PROCEDURE — 99024 POSTOP FOLLOW-UP VISIT: CPT

## 2017-09-06 ENCOUNTER — NON-APPOINTMENT (OUTPATIENT)
Age: 37
End: 2017-09-06

## 2017-09-06 ENCOUNTER — APPOINTMENT (OUTPATIENT)
Dept: CARDIOLOGY | Facility: CLINIC | Age: 37
End: 2017-09-06
Payer: COMMERCIAL

## 2017-09-06 VITALS
BODY MASS INDEX: 26.68 KG/M2 | DIASTOLIC BLOOD PRESSURE: 75 MMHG | HEIGHT: 67 IN | OXYGEN SATURATION: 95 % | HEART RATE: 64 BPM | WEIGHT: 170 LBS | SYSTOLIC BLOOD PRESSURE: 122 MMHG

## 2017-09-06 PROCEDURE — 99215 OFFICE O/P EST HI 40 MIN: CPT | Mod: 24

## 2017-09-06 PROCEDURE — 93000 ELECTROCARDIOGRAM COMPLETE: CPT

## 2017-09-06 RX ORDER — PRIMIDONE 50 MG/1
50 TABLET ORAL
Refills: 0 | Status: DISCONTINUED | COMMUNITY
End: 2017-09-06

## 2017-10-05 ENCOUNTER — APPOINTMENT (OUTPATIENT)
Dept: ELECTROPHYSIOLOGY | Facility: CLINIC | Age: 37
End: 2017-10-05

## 2018-01-22 ENCOUNTER — APPOINTMENT (OUTPATIENT)
Dept: CARDIOLOGY | Facility: CLINIC | Age: 38
End: 2018-01-22
Payer: COMMERCIAL

## 2018-01-22 ENCOUNTER — NON-APPOINTMENT (OUTPATIENT)
Age: 38
End: 2018-01-22

## 2018-01-22 VITALS
OXYGEN SATURATION: 96 % | SYSTOLIC BLOOD PRESSURE: 119 MMHG | BODY MASS INDEX: 27 KG/M2 | HEART RATE: 67 BPM | WEIGHT: 172 LBS | HEIGHT: 67 IN | DIASTOLIC BLOOD PRESSURE: 71 MMHG

## 2018-01-22 PROCEDURE — 99214 OFFICE O/P EST MOD 30 MIN: CPT

## 2018-01-22 PROCEDURE — 93000 ELECTROCARDIOGRAM COMPLETE: CPT

## 2018-06-22 ENCOUNTER — NON-APPOINTMENT (OUTPATIENT)
Age: 38
End: 2018-06-22

## 2018-06-22 ENCOUNTER — APPOINTMENT (OUTPATIENT)
Dept: CARDIOLOGY | Facility: CLINIC | Age: 38
End: 2018-06-22
Payer: COMMERCIAL

## 2018-06-22 VITALS
WEIGHT: 170 LBS | HEIGHT: 67 IN | SYSTOLIC BLOOD PRESSURE: 116 MMHG | DIASTOLIC BLOOD PRESSURE: 72 MMHG | HEART RATE: 59 BPM | BODY MASS INDEX: 26.68 KG/M2

## 2018-06-22 PROCEDURE — 93000 ELECTROCARDIOGRAM COMPLETE: CPT

## 2018-06-22 PROCEDURE — 99214 OFFICE O/P EST MOD 30 MIN: CPT

## 2018-11-27 PROBLEM — G45.9 TRANSIENT CEREBRAL ISCHEMIC ATTACK, UNSPECIFIED: Chronic | Status: ACTIVE | Noted: 2017-08-21

## 2019-01-09 ENCOUNTER — RESULT CHARGE (OUTPATIENT)
Age: 39
End: 2019-01-09

## 2019-01-09 ENCOUNTER — APPOINTMENT (OUTPATIENT)
Dept: CARDIOLOGY | Facility: CLINIC | Age: 39
End: 2019-01-09
Payer: COMMERCIAL

## 2019-01-09 ENCOUNTER — NON-APPOINTMENT (OUTPATIENT)
Age: 39
End: 2019-01-09

## 2019-01-09 VITALS
HEIGHT: 67 IN | SYSTOLIC BLOOD PRESSURE: 114 MMHG | DIASTOLIC BLOOD PRESSURE: 72 MMHG | WEIGHT: 175 LBS | BODY MASS INDEX: 27.47 KG/M2 | HEART RATE: 60 BPM | OXYGEN SATURATION: 97 %

## 2019-01-09 PROCEDURE — 93000 ELECTROCARDIOGRAM COMPLETE: CPT

## 2019-01-09 PROCEDURE — 99214 OFFICE O/P EST MOD 30 MIN: CPT

## 2019-01-28 ENCOUNTER — NON-APPOINTMENT (OUTPATIENT)
Age: 39
End: 2019-01-28

## 2019-01-28 ENCOUNTER — APPOINTMENT (OUTPATIENT)
Dept: CARDIOLOGY | Facility: CLINIC | Age: 39
End: 2019-01-28
Payer: COMMERCIAL

## 2019-01-28 VITALS
BODY MASS INDEX: 26.68 KG/M2 | HEIGHT: 67 IN | DIASTOLIC BLOOD PRESSURE: 70 MMHG | HEART RATE: 56 BPM | OXYGEN SATURATION: 100 % | WEIGHT: 170 LBS | SYSTOLIC BLOOD PRESSURE: 114 MMHG

## 2019-01-28 PROCEDURE — 93000 ELECTROCARDIOGRAM COMPLETE: CPT

## 2019-01-28 PROCEDURE — 99215 OFFICE O/P EST HI 40 MIN: CPT

## 2019-01-28 PROCEDURE — 99205 OFFICE O/P NEW HI 60 MIN: CPT

## 2019-01-31 NOTE — DISCUSSION/SUMMARY
[FreeTextEntry1] : RAD was reviewed.  At maximum excursion, the gap between Septum primum and septum secundum of atrial septum was 4.1mm.  These patients have been found to have a higher risk of repeat stroke on medical therapy alone.  I have recommended percutaneous closure.  The procedure was explained to the patient who agreed to proceed.

## 2019-01-31 NOTE — PHYSICAL EXAM
[General Appearance - Well Developed] : well developed [Normal Appearance] : normal appearance [Well Groomed] : well groomed [General Appearance - Well Nourished] : well nourished [No Deformities] : no deformities [General Appearance - In No Acute Distress] : no acute distress [Normal Conjunctiva] : the conjunctiva exhibited no abnormalities [Eyelids - No Xanthelasma] : the eyelids demonstrated no xanthelasmas [Normal Oral Mucosa] : normal oral mucosa [No Oral Pallor] : no oral pallor [No Oral Cyanosis] : no oral cyanosis [Normal Jugular Venous A Waves Present] : normal jugular venous A waves present [Normal Jugular Venous V Waves Present] : normal jugular venous V waves present [No Jugular Venous Heller A Waves] : no jugular venous heller A waves [Heart Rate And Rhythm] : heart rate and rhythm were normal [Heart Sounds] : normal S1 and S2 [Murmurs] : no murmurs present [Respiration, Rhythm And Depth] : normal respiratory rhythm and effort [Exaggerated Use Of Accessory Muscles For Inspiration] : no accessory muscle use [Auscultation Breath Sounds / Voice Sounds] : lungs were clear to auscultation bilaterally [Abdomen Soft] : soft [Abdomen Tenderness] : non-tender [Abdomen Mass (___ Cm)] : no abdominal mass palpated [Abnormal Walk] : normal gait [Gait - Sufficient For Exercise Testing] : the gait was sufficient for exercise testing [Nail Clubbing] : no clubbing of the fingernails [Cyanosis, Localized] : no localized cyanosis [Petechial Hemorrhages (___cm)] : no petechial hemorrhages [Skin Color & Pigmentation] : normal skin color and pigmentation [] : no rash [No Venous Stasis] : no venous stasis [Skin Lesions] : no skin lesions [No Skin Ulcers] : no skin ulcer [No Xanthoma] : no  xanthoma was observed [Oriented To Time, Place, And Person] : oriented to person, place, and time [Affect] : the affect was normal [Mood] : the mood was normal [No Anxiety] : not feeling anxious

## 2019-01-31 NOTE — REASON FOR VISIT
[Consultation] : a consultation regarding [FreeTextEntry1] : cryptogenic stroke and intracardiac shunt

## 2019-01-31 NOTE — HISTORY OF PRESENT ILLNESS
[FreeTextEntry1] : 38 year old man who presented with transient right arm weakness and difficulty with speech several months ago.  He has no major medical problems.  No vascular disease was demonstrated and RAD shoed and intracardiac right to left shunt.

## 2019-02-09 ENCOUNTER — EMERGENCY (EMERGENCY)
Facility: HOSPITAL | Age: 39
LOS: 1 days | Discharge: HOME | End: 2019-02-09
Attending: EMERGENCY MEDICINE | Admitting: EMERGENCY MEDICINE
Payer: COMMERCIAL

## 2019-02-09 VITALS
TEMPERATURE: 98 F | SYSTOLIC BLOOD PRESSURE: 124 MMHG | RESPIRATION RATE: 16 BRPM | DIASTOLIC BLOOD PRESSURE: 78 MMHG | HEART RATE: 68 BPM | OXYGEN SATURATION: 99 %

## 2019-02-09 VITALS
DIASTOLIC BLOOD PRESSURE: 82 MMHG | TEMPERATURE: 98 F | OXYGEN SATURATION: 100 % | HEART RATE: 72 BPM | HEIGHT: 67 IN | WEIGHT: 169.98 LBS | SYSTOLIC BLOOD PRESSURE: 130 MMHG | RESPIRATION RATE: 16 BRPM

## 2019-02-09 PROCEDURE — 99284 EMERGENCY DEPT VISIT MOD MDM: CPT

## 2019-02-09 PROCEDURE — 70450 CT HEAD/BRAIN W/O DYE: CPT | Mod: 26

## 2019-02-09 PROCEDURE — 70450 CT HEAD/BRAIN W/O DYE: CPT

## 2019-02-09 PROCEDURE — 99284 EMERGENCY DEPT VISIT MOD MDM: CPT | Mod: 25

## 2019-02-09 RX ORDER — ACETAMINOPHEN 500 MG
2 TABLET ORAL
Qty: 0 | Refills: 0 | COMMUNITY

## 2019-02-09 NOTE — ED ADULT TRIAGE NOTE - CHIEF COMPLAINT QUOTE
hit back of the head on turf floor playing football  denies LOC  "I am on blood thinners & I just want to make sure I am OK"

## 2019-02-09 NOTE — ED ADULT NURSE NOTE - CHPI ED NUR SYMPTOMS NEG
no weakness/no dizziness/no loss of consciousness/no numbness/no blurred vision/no vomiting/no change in level of consciousness/no fever/no confusion/no nausea

## 2019-02-09 NOTE — ED ADULT NURSE NOTE - NSIMPLEMENTINTERV_GEN_ALL_ED
Implemented All Fall Risk Interventions:  [place] to call system. Call bell, personal items and telephone within reach. Instruct patient to call for assistance. Room bathroom lighting operational. Non-slip footwear when patient is off stretcher. Physically safe environment: no spills, clutter or unnecessary equipment. Stretcher in lowest position, wheels locked, appropriate side rails in place. Provide visual cue, wrist band, yellow gown, etc. Monitor gait and stability. Monitor for mental status changes and reorient to person, place, and time. Review medications for side effects contributing to fall risk. Reinforce activity limits and safety measures with patient and family.

## 2019-02-09 NOTE — ED PROVIDER NOTE - OBJECTIVE STATEMENT
37 yo white male with H/O PFO and TIA presently taking ASA and Plavix who while playing football today fell and hit back of head, without LOC and or neck pain here c/o mild headache. No nausea, vomiting, diplopia or numbness.

## 2019-02-19 ENCOUNTER — APPOINTMENT (OUTPATIENT)
Dept: CV DIAGNOSITCS | Facility: HOSPITAL | Age: 39
End: 2019-02-19

## 2019-02-19 ENCOUNTER — INPATIENT (INPATIENT)
Facility: HOSPITAL | Age: 39
LOS: 0 days | Discharge: HOME | DRG: 274 | End: 2019-02-20
Attending: INTERNAL MEDICINE | Admitting: INTERNAL MEDICINE
Payer: COMMERCIAL

## 2019-02-19 ENCOUNTER — TRANSCRIPTION ENCOUNTER (OUTPATIENT)
Age: 39
End: 2019-02-19

## 2019-02-19 VITALS
TEMPERATURE: 98 F | WEIGHT: 169.98 LBS | HEART RATE: 61 BPM | DIASTOLIC BLOOD PRESSURE: 64 MMHG | OXYGEN SATURATION: 97 % | RESPIRATION RATE: 18 BRPM | SYSTOLIC BLOOD PRESSURE: 131 MMHG | HEIGHT: 67 IN

## 2019-02-19 DIAGNOSIS — Z98.52 VASECTOMY STATUS: Chronic | ICD-10-CM

## 2019-02-19 DIAGNOSIS — Q21.1 ATRIAL SEPTAL DEFECT: ICD-10-CM

## 2019-02-19 LAB
ALBUMIN SERPL ELPH-MCNC: 4.8 G/DL — SIGNIFICANT CHANGE UP (ref 3.3–5)
ALP SERPL-CCNC: 90 U/L — SIGNIFICANT CHANGE UP (ref 40–120)
ALT FLD-CCNC: 25 U/L — SIGNIFICANT CHANGE UP (ref 10–45)
ANION GAP SERPL CALC-SCNC: 13 MMOL/L — SIGNIFICANT CHANGE UP (ref 5–17)
AST SERPL-CCNC: 22 U/L — SIGNIFICANT CHANGE UP (ref 10–40)
BILIRUB SERPL-MCNC: 0.5 MG/DL — SIGNIFICANT CHANGE UP (ref 0.2–1.2)
BUN SERPL-MCNC: 14 MG/DL — SIGNIFICANT CHANGE UP (ref 7–23)
CALCIUM SERPL-MCNC: 9.5 MG/DL — SIGNIFICANT CHANGE UP (ref 8.4–10.5)
CHLORIDE SERPL-SCNC: 102 MMOL/L — SIGNIFICANT CHANGE UP (ref 96–108)
CO2 SERPL-SCNC: 27 MMOL/L — SIGNIFICANT CHANGE UP (ref 22–31)
CREAT SERPL-MCNC: 0.9 MG/DL — SIGNIFICANT CHANGE UP (ref 0.5–1.3)
GLUCOSE SERPL-MCNC: 98 MG/DL — SIGNIFICANT CHANGE UP (ref 70–99)
HCT VFR BLD CALC: 42.2 % — SIGNIFICANT CHANGE UP (ref 39–50)
HGB BLD-MCNC: 15 G/DL — SIGNIFICANT CHANGE UP (ref 13–17)
MCHC RBC-ENTMCNC: 32.5 PG — SIGNIFICANT CHANGE UP (ref 27–34)
MCHC RBC-ENTMCNC: 35.6 GM/DL — SIGNIFICANT CHANGE UP (ref 32–36)
MCV RBC AUTO: 91.2 FL — SIGNIFICANT CHANGE UP (ref 80–100)
PLATELET # BLD AUTO: 157 K/UL — SIGNIFICANT CHANGE UP (ref 150–400)
POTASSIUM SERPL-MCNC: 3.8 MMOL/L — SIGNIFICANT CHANGE UP (ref 3.5–5.3)
POTASSIUM SERPL-SCNC: 3.8 MMOL/L — SIGNIFICANT CHANGE UP (ref 3.5–5.3)
PROT SERPL-MCNC: 6.6 G/DL — SIGNIFICANT CHANGE UP (ref 6–8.3)
RBC # BLD: 4.62 M/UL — SIGNIFICANT CHANGE UP (ref 4.2–5.8)
RBC # FLD: 11.7 % — SIGNIFICANT CHANGE UP (ref 10.3–14.5)
SODIUM SERPL-SCNC: 142 MMOL/L — SIGNIFICANT CHANGE UP (ref 135–145)
WBC # BLD: 4.6 K/UL — SIGNIFICANT CHANGE UP (ref 3.8–10.5)
WBC # FLD AUTO: 4.6 K/UL — SIGNIFICANT CHANGE UP (ref 3.8–10.5)

## 2019-02-19 PROCEDURE — 93306 TTE W/DOPPLER COMPLETE: CPT | Mod: 26

## 2019-02-19 PROCEDURE — 93010 ELECTROCARDIOGRAM REPORT: CPT | Mod: 77

## 2019-02-19 PROCEDURE — 93010 ELECTROCARDIOGRAM REPORT: CPT

## 2019-02-19 RX ORDER — CLOPIDOGREL BISULFATE 75 MG/1
75 TABLET, FILM COATED ORAL DAILY
Qty: 0 | Refills: 0 | Status: DISCONTINUED | OUTPATIENT
Start: 2019-02-19 | End: 2019-02-20

## 2019-02-19 RX ORDER — ASPIRIN/CALCIUM CARB/MAGNESIUM 324 MG
1 TABLET ORAL
Qty: 30 | Refills: 11
Start: 2019-02-19 | End: 2020-02-13

## 2019-02-19 RX ORDER — CLOPIDOGREL BISULFATE 75 MG/1
1 TABLET, FILM COATED ORAL
Qty: 0 | Refills: 0 | COMMUNITY

## 2019-02-19 RX ORDER — CEFAZOLIN SODIUM 1 G
1000 VIAL (EA) INJECTION EVERY 8 HOURS
Qty: 0 | Refills: 0 | Status: COMPLETED | OUTPATIENT
Start: 2019-02-19 | End: 2019-02-20

## 2019-02-19 RX ORDER — ASPIRIN/CALCIUM CARB/MAGNESIUM 324 MG
81 TABLET ORAL DAILY
Qty: 0 | Refills: 0 | Status: DISCONTINUED | OUTPATIENT
Start: 2019-02-19 | End: 2019-02-20

## 2019-02-19 RX ORDER — ZALEPLON 10 MG
5 CAPSULE ORAL AT BEDTIME
Qty: 0 | Refills: 0 | Status: DISCONTINUED | OUTPATIENT
Start: 2019-02-19 | End: 2019-02-20

## 2019-02-19 RX ORDER — ASPIRIN/CALCIUM CARB/MAGNESIUM 324 MG
1 TABLET ORAL
Qty: 0 | Refills: 0 | COMMUNITY

## 2019-02-19 RX ORDER — CLOPIDOGREL BISULFATE 75 MG/1
1 TABLET, FILM COATED ORAL
Qty: 30 | Refills: 11
Start: 2019-02-19 | End: 2020-02-13

## 2019-02-19 RX ORDER — ACETAMINOPHEN 500 MG
650 TABLET ORAL EVERY 6 HOURS
Qty: 0 | Refills: 0 | Status: DISCONTINUED | OUTPATIENT
Start: 2019-02-19 | End: 2019-02-20

## 2019-02-19 RX ORDER — SODIUM CHLORIDE 9 MG/ML
3 INJECTION INTRAMUSCULAR; INTRAVENOUS; SUBCUTANEOUS EVERY 8 HOURS
Qty: 0 | Refills: 0 | Status: DISCONTINUED | OUTPATIENT
Start: 2019-02-19 | End: 2019-02-20

## 2019-02-19 RX ADMIN — Medication 650 MILLIGRAM(S): at 10:37

## 2019-02-19 RX ADMIN — Medication 650 MILLIGRAM(S): at 11:10

## 2019-02-19 RX ADMIN — Medication 100 MILLIGRAM(S): at 17:27

## 2019-02-19 RX ADMIN — Medication 5 MILLIGRAM(S): at 21:06

## 2019-02-19 RX ADMIN — SODIUM CHLORIDE 3 MILLILITER(S): 9 INJECTION INTRAMUSCULAR; INTRAVENOUS; SUBCUTANEOUS at 21:02

## 2019-02-19 RX ADMIN — SODIUM CHLORIDE 3 MILLILITER(S): 9 INJECTION INTRAMUSCULAR; INTRAVENOUS; SUBCUTANEOUS at 13:26

## 2019-02-19 NOTE — DISCHARGE NOTE ADULT - PLAN OF CARE
Pt remains chest pain free and understands post cath discharge instructions No heavy lifting, strenuous activity, bending, straining or unnecessary stair climbing  for 2 weeks. No sex for 1 week.  No driving for 2 days. You may shower 24 hours following procedure but avoid baths and swimming for 1 week. Check groin site for bleeding and/or swelling daily following procedure. Call your doctor/cardiologist immediately should it occur or if you have increased/persistent pain at the site. Follow up with your cardiologist in 1- 2 weeks. You may call [facility] at [phone] or [phone] after office hours and weekends  with any questions or concerns following your procedure. Take medications as prescribed. Your LDL cholesterol will be less than 70mg/dL Continue with your cholesterol medications. Eat a heart healthy diet that is low in saturated fats and salt, and includes whole grains, fruits, vegetables and lean protein; exercise regularly (consult with your physician or cardiologist first); maintain a heart healthy weight. Continue to follow with your primary physician or cardiologist for treatment goals, continue medication, have liver function testing every 3 months as anti lipid medications can cause liver irritation. If you smoke - quit (A resource to help you stop smoking is the [facility] – phone number [phone].).

## 2019-02-19 NOTE — DISCHARGE NOTE ADULT - ADDITIONAL INSTRUCTIONS
Will need antibiotic prophylaxis for any invasive procedure x 6 months  You will need a repeat echocardiogram at 4-6 weeks and at 6 months Will need antibiotic prophylaxis for any invasive procedure x 6 months  You will need a repeat echocardiogram at 1 month and at 6 months  Follow up with Dr Atkinson, call to schedule appointment

## 2019-02-19 NOTE — DISCHARGE NOTE ADULT - MEDICATION SUMMARY - MEDICATIONS TO TAKE
I will START or STAY ON the medications listed below when I get home from the hospital:    Aspirin Enteric Coated 81 mg oral delayed release tablet  -- 1 tab(s) by mouth once a day  -- Indication: For TO KEEP STENT OPEN     Plavix 75 mg oral tablet  -- 1 tab(s) by mouth once a day  -- Indication: For TO KEEP STENT OPEN I will START or STAY ON the medications listed below when I get home from the hospital:    Aspirin Enteric Coated 81 mg oral delayed release tablet  -- 1 tab(s) by mouth once a day  -- Indication: For stroke prevention    Plavix 75 mg oral tablet  -- 1 tab(s) by mouth once a day  -- Indication: For stroke prevention

## 2019-02-19 NOTE — DISCHARGE NOTE ADULT - CONDITION (STATED IN TERMS THAT PERMIT A SPECIFIC MEASURABLE COMPARISON WITH CONDITION ON ADMISSION):
stable for discharge, no shortness of breath, no groin discomfort, site without bleeding or hematoma

## 2019-02-19 NOTE — DISCHARGE NOTE ADULT - CARE PLAN
Principal Discharge DX:	PFO (patent foramen ovale)  Goal:	Pt remains chest pain free and understands post cath discharge instructions  Assessment and plan of treatment:	No heavy lifting, strenuous activity, bending, straining or unnecessary stair climbing  for 2 weeks. No sex for 1 week.  No driving for 2 days. You may shower 24 hours following procedure but avoid baths and swimming for 1 week. Check groin site for bleeding and/or swelling daily following procedure. Call your doctor/cardiologist immediately should it occur or if you have increased/persistent pain at the site. Follow up with your cardiologist in 1- 2 weeks. You may call [facility] at [phone] or [phone] after office hours and weekends  with any questions or concerns following your procedure. Take medications as prescribed.  Secondary Diagnosis:	HLD (hyperlipidemia)  Goal:	Your LDL cholesterol will be less than 70mg/dL  Assessment and plan of treatment:	Continue with your cholesterol medications. Eat a heart healthy diet that is low in saturated fats and salt, and includes whole grains, fruits, vegetables and lean protein; exercise regularly (consult with your physician or cardiologist first); maintain a heart healthy weight. Continue to follow with your primary physician or cardiologist for treatment goals, continue medication, have liver function testing every 3 months as anti lipid medications can cause liver irritation. If you smoke - quit (A resource to help you stop smoking is the [facility] – phone number [phone].).

## 2019-02-19 NOTE — CHART NOTE - NSCHARTNOTEFT_GEN_A_CORE
Removal of Femoral Sheath    Pulses in the (right) lower extremity are (palpable). The patient was placed in the supine position. The insertion site was identified and the sutures were removed per protocol.  The __8_ Spanish venous femoral sheath x 2 was then removed. Direct pressure was applied for  ____20__ minutes.     Monitoring of the (right) groin and both lower extremities including neuro-vascular checks and vital signs every 15 minutes x 4, then every 30 minutes x 2, then every 1 hour x 2 and the every 4 hours was ordered.    Complications: No hematoma, no bleed    Comments:  Groin care reviewed with pt. Pt verbalizes understanding. Bedrest explained to pt.    Jeff Correa, NP  x [phone]

## 2019-02-19 NOTE — DISCHARGE NOTE ADULT - PATIENT PORTAL LINK FT
You can access the [facility] Patient Portal, offered by [facility], by registering with the following website: [email or web address]

## 2019-02-19 NOTE — DISCHARGE NOTE ADULT - HOSPITAL COURSE
39 yo white male with PMHx with diet controlled HLD, cryptogenic TIA in 2/2017 treated with TPA, found having PFO, now asymptomatic presents for PFO closure. Pt reports he had right hand numbness progressed to upper arm then lost speech, went to [place] ER treated with TPA. RAD showed PFO, evaluated by Dr. Phelan then referred for PFO closure.Pt is now s/p PFO closure vir right femoral artery access 39 yo white male with PMHx with diet controlled HLD, cryptogenic TIA in 2/2017 treated with TPA, found having PFO, now asymptomatic presents for PFO closure. Pt reports he had right hand numbness progressed to upper arm then lost speech, went to [place] ER treated with TPA. RAD showed PFO, evaluated by Dr. Phelan then referred for PFO closure.Pt is now s/p PFO closure via right femoral artery access 39 yo white male with PMHx with diet controlled HLD, cryptogenic TIA in 2/2017 treated with TPA, found having PFO, now asymptomatic presents for PFO closure. Pt reports he had right hand numbness progressed to upper arm then lost speech, went to [place] ER treated with TPA. RAD showed PFO, evaluated by Dr. Phelan then referred for PFO closure.Pt is now s/p PFO closure via right femoral venous access. Post procedure TTE:    < from: Transthoracic Echocardiogram (02.19.19 @ 18:57) >  Conclusions:  1. Increased relative wall thickness with normal left  ventricular mass index, consistent with concentric left  ventricular remodeling.  2. Normal left ventricular systolic function. No segmental  wall motion abnormalities.  3. Normal diastolic function  4. Normal right ventricular size and function.  5. Estimated right ventricular systolic pressure equals 23  mm Hg, assuming right atrial pressure equals 8 mm Hg,  consistent with normal pulmonary pressures.  6. Foreign material is seen in the interatrial septum  consistent with PFO closure device. Color Doppler  demonstrates no evidence of residual flow across the  interatrial septum.  *** Compared with echocardiogram (intraprocedural RAD) of  8/18/2017, a PFO closure device is now seen in the  interatrial septum.    Pt stable for discharge.

## 2019-02-19 NOTE — H&P CARDIOLOGY - VENOUS THROMBOEMBOLISM
Faxed [employer] patient Assistance program Prescription Request form for Avonex to Cylande at [phone] on 4/4/17  
no

## 2019-02-19 NOTE — H&P CARDIOLOGY - HISTORY OF PRESENT ILLNESS
37 yo white male with PMHx with diet controlled HLD, cryptogenic TIA in 2/2017 treated with TPA, found having PFO, now asymptomatic presents for PFO closure. Pt reports he had right hand numbness progressed to upper arm then lost speech, went to [place] [facility] treated with TPA. RAD showed PFO, evaluated by Dr. Phelan then referred for PFO closure. Pt denies dizziness, weakness or altered speech. Pt has Loop recorder placed last year.     < from: Transesophageal Echocardiogram (08.18.17 @ 12:10) >  1. Normal left atrium.  LA volume index = 27 cc/m2. No left atrial or left atrial appendage thrombus. Normal left atrial appendage function (velocity= (101) cm/s).  2. Normal left ventricular systolic function. No segmental wall motion abnormalities.  3. Normal diastolic function  4. Normal right ventricular size and function.  5. Agitated saline injection and color flow Doppler demonstrates evidence of a very small patent foramen ovale. < end of copied text >

## 2019-02-19 NOTE — H&P CARDIOLOGY - FAMILY HISTORY
Father  Still living? Yes, Estimated age: Age Unknown  Family history of coronary artery disease in father, Age at diagnosis: Age Unknown  Family history of high cholesterol, Age at diagnosis: Age Unknown

## 2019-02-19 NOTE — DISCHARGE NOTE ADULT - CARE PROVIDER_API CALL
Ham Atkinson)  Cardiology; Internal Medicine; Interventional Cardiology  [address] [place] [address]  Phone: [phone]  Fax: [phone]  Follow Up Time:

## 2019-02-20 VITALS
SYSTOLIC BLOOD PRESSURE: 115 MMHG | TEMPERATURE: 98 F | HEART RATE: 57 BPM | RESPIRATION RATE: 18 BRPM | OXYGEN SATURATION: 98 % | DIASTOLIC BLOOD PRESSURE: 71 MMHG

## 2019-02-20 LAB
ANION GAP SERPL CALC-SCNC: 13 MMOL/L — SIGNIFICANT CHANGE UP (ref 5–17)
BUN SERPL-MCNC: 13 MG/DL — SIGNIFICANT CHANGE UP (ref 7–23)
CALCIUM SERPL-MCNC: 9.3 MG/DL — SIGNIFICANT CHANGE UP (ref 8.4–10.5)
CHLORIDE SERPL-SCNC: 103 MMOL/L — SIGNIFICANT CHANGE UP (ref 96–108)
CO2 SERPL-SCNC: 25 MMOL/L — SIGNIFICANT CHANGE UP (ref 22–31)
CREAT SERPL-MCNC: 0.95 MG/DL — SIGNIFICANT CHANGE UP (ref 0.5–1.3)
GLUCOSE SERPL-MCNC: 106 MG/DL — HIGH (ref 70–99)
HCT VFR BLD CALC: 43.4 % — SIGNIFICANT CHANGE UP (ref 39–50)
HGB BLD-MCNC: 14.6 G/DL — SIGNIFICANT CHANGE UP (ref 13–17)
MCHC RBC-ENTMCNC: 30.7 PG — SIGNIFICANT CHANGE UP (ref 27–34)
MCHC RBC-ENTMCNC: 33.7 GM/DL — SIGNIFICANT CHANGE UP (ref 32–36)
MCV RBC AUTO: 91.2 FL — SIGNIFICANT CHANGE UP (ref 80–100)
PLATELET # BLD AUTO: 168 K/UL — SIGNIFICANT CHANGE UP (ref 150–400)
POTASSIUM SERPL-MCNC: 3.6 MMOL/L — SIGNIFICANT CHANGE UP (ref 3.5–5.3)
POTASSIUM SERPL-SCNC: 3.6 MMOL/L — SIGNIFICANT CHANGE UP (ref 3.5–5.3)
RBC # BLD: 4.76 M/UL — SIGNIFICANT CHANGE UP (ref 4.2–5.8)
RBC # FLD: 11.3 % — SIGNIFICANT CHANGE UP (ref 10.3–14.5)
SODIUM SERPL-SCNC: 141 MMOL/L — SIGNIFICANT CHANGE UP (ref 135–145)
WBC # BLD: 6.4 K/UL — SIGNIFICANT CHANGE UP (ref 3.8–10.5)
WBC # FLD AUTO: 6.4 K/UL — SIGNIFICANT CHANGE UP (ref 3.8–10.5)

## 2019-02-20 PROCEDURE — 80053 COMPREHEN METABOLIC PANEL: CPT

## 2019-02-20 PROCEDURE — 93580 TRANSCATH CLOSURE OF ASD: CPT

## 2019-02-20 PROCEDURE — C1887: CPT

## 2019-02-20 PROCEDURE — 93005 ELECTROCARDIOGRAM TRACING: CPT

## 2019-02-20 PROCEDURE — C1817: CPT

## 2019-02-20 PROCEDURE — 80048 BASIC METABOLIC PNL TOTAL CA: CPT

## 2019-02-20 PROCEDURE — C1769: CPT

## 2019-02-20 PROCEDURE — C1759: CPT

## 2019-02-20 PROCEDURE — C1894: CPT

## 2019-02-20 PROCEDURE — 93306 TTE W/DOPPLER COMPLETE: CPT

## 2019-02-20 PROCEDURE — 85027 COMPLETE CBC AUTOMATED: CPT

## 2019-02-20 RX ADMIN — CLOPIDOGREL BISULFATE 75 MILLIGRAM(S): 75 TABLET, FILM COATED ORAL at 04:40

## 2019-02-20 RX ADMIN — Medication 81 MILLIGRAM(S): at 04:40

## 2019-02-20 RX ADMIN — Medication 100 MILLIGRAM(S): at 00:09

## 2019-02-20 RX ADMIN — SODIUM CHLORIDE 3 MILLILITER(S): 9 INJECTION INTRAMUSCULAR; INTRAVENOUS; SUBCUTANEOUS at 04:43

## 2019-02-20 NOTE — PROGRESS NOTE ADULT - SUBJECTIVE AND OBJECTIVE BOX
Patient is a 38y old  Male who presents with a chief complaint of PFO (2019 10:29) now s/p PFO closure right femoral artery access.           Allergies    No Known Allergies    Intolerances        Medications:  acetaminophen   Tablet .. 650 milliGRAM(s) Oral every 6 hours PRN  aspirin enteric coated 81 milliGRAM(s) Oral daily  clopidogrel Tablet 75 milliGRAM(s) Oral daily  sodium chloride 0.9% lock flush 3 milliLiter(s) IV Push every 8 hours  zaleplon 5 milliGRAM(s) Oral at bedtime PRN      Vitals:  T(C): 36.7 (19 @ 19:46), Max: 36.7 (19 @ 06:58)  HR: 62 (19 @ 19:46) (53 - 70)  BP: 126/70 (19 @ 19:46) (102/71 - 131/64)  BP(mean): 86 (19 @ 06:58) (86 - 86)  RR: 18 (19 @ 19:46) (18 - 18)  SpO2: 99% (19 @ 19:46) (97% - 99%)  Wt(kg): --  Daily Height in cm: 170.18 (2019 09:30)    Daily Weight in k.1 (2019 06:58)  I&O's Summary    2019 07:01  -  2019 04:18  --------------------------------------------------------  IN: 290 mL / OUT: 0 mL / NET: 290 mL          Physical Exam:  Procedural Access Site: Right femoral artery access No hematoma, Non-tender to palpation, 2+ pulse, No bruit, No Ecchymosis  Respiratory: Clear to auscultation bilaterally  Skin: No rashes, No ecchymoses, No cyanosis        141  |  103  |  13  ----------------------------<  106<H>  3.6   |  25  |  0.95    Ca    9.3      2019 00:27    TPro  6.6  /  Alb  4.8  /  TBili  0.5  /  DBili  x   /  AST  22  /  ALT  25  /  AlkPhos  90      Interpretation of Telemetry:

## 2019-02-20 NOTE — PROGRESS NOTE ADULT - ASSESSMENT
Patient is a 38y old  Male who presents with a chief complaint of PFO (19 Feb 2019 10:29) now s/p PFO closure right femoral artery access. Pt tolerated the procedure well, cath site benign. Overnight remained uneventful. Post-procedure discharge instructions discussed and questions addressed.

## 2019-03-02 ENCOUNTER — TRANSCRIPTION ENCOUNTER (OUTPATIENT)
Age: 39
End: 2019-03-02

## 2019-07-31 NOTE — PHYSICAL THERAPY INITIAL EVALUATION ADULT - PHYSICAL ASSIST/NONPHYSICAL ASSIST: STAND/SIT, REHAB EVAL
Patient Name: Will Kan  MRN: [id number]    INFORMED CONSENT: The procedure, risks, benefits and options were discussed with patient. There are no contraindications to the procedure. The patient expressed understanding and agreed to proceed. The personnel performing the procedure was discussed. I verify that I personally obtained Will's consent prior to the start of the procedure and the signed consent can be found on the patient's chart.    Procedure Date: 07/31/2019    Anesthesia: Topical    Pre Procedure diagnosis: Lumbar radiculopathy [M54.16]  DDD (degenerative disc disease), lumbar [M51.36]  1. Lumbar degenerative disc disease    2. Lumbar radiculopathy    3. DDD (degenerative disc disease), lumbar      Post-Procedure diagnosis: SAME    Sedation: Yes - Fentanyl 50 mcg, Versed 1mg IV    PROCEDURE:Bilateral L4/5 TRANSFORAMINAL EPIDURAL STEROID INJECTION    *Of note, patient has a transitional lumbosacral veterbrae. Correct level was determined by counting down from L1.         DESCRIPTION OF PROCEDURE: The patient was brought to the procedure room. After performing time out IV access was obtained prior to the procedure. The patient was positioned prone on the fluoroscopy table. Continuous hemodynamic monitoring was initiated including blood pressure, EKG, and pulse oximetry. . The skin was prepped with chlorhexidine three times and draped in a sterile fashion. Skin anesthesia was achieved using 3 mL of lidocaine 1% over the respective injection site.     An oblique fluoroscopic view was obtained, with the superior articular process of the inferior vertebral body aligned with the pedicle. The tip of a 22-gauge 3.5-inch Quincke-type spinal needle was advanced toward the 6 oclock position of the pedicle under intermittent fluoroscopic guidance. Confirmation of proper needle position was made with AP, oblique, and lateral fluoroscopic views. Negative aspiration for blood or CSF was confirmed. 2 mL of  Omnipaque 300 was injected. Live fluoroscopic imaging revealed a clear outline of the spinal nerve with proximal spread of agent through the neural foramen into the anterior epidural space. A total combination of 3 mL of Lidocaine 0.5% and 10 mg decadron was injected at each level. Contrast spread was noted from L4 to L5 level. There was no pain on injection. The needle was removed and bleeding was nil.  A sterile dressing was applied. Will was taken back to the recovery room for further observation.     Blood Loss: Nill  Specimen: None    R Yfn Barahona MD     supervision

## 2019-08-14 PROBLEM — Q21.1 ATRIAL SEPTAL DEFECT: Chronic | Status: ACTIVE | Noted: 2019-02-19

## 2019-08-14 PROBLEM — E78.5 HYPERLIPIDEMIA, UNSPECIFIED: Chronic | Status: ACTIVE | Noted: 2019-02-19

## 2019-09-23 ENCOUNTER — APPOINTMENT (OUTPATIENT)
Dept: CARDIOLOGY | Facility: CLINIC | Age: 39
End: 2019-09-23

## 2019-10-15 ENCOUNTER — NON-APPOINTMENT (OUTPATIENT)
Age: 39
End: 2019-10-15

## 2019-10-15 ENCOUNTER — APPOINTMENT (OUTPATIENT)
Dept: CARDIOLOGY | Facility: CLINIC | Age: 39
End: 2019-10-15
Payer: COMMERCIAL

## 2019-10-15 VITALS
HEIGHT: 67 IN | WEIGHT: 168 LBS | OXYGEN SATURATION: 9 % | SYSTOLIC BLOOD PRESSURE: 130 MMHG | DIASTOLIC BLOOD PRESSURE: 72 MMHG | BODY MASS INDEX: 26.37 KG/M2 | HEART RATE: 58 BPM

## 2019-10-15 DIAGNOSIS — Q24.8 OTHER SPECIFIED CONGENITAL MALFORMATIONS OF HEART: ICD-10-CM

## 2019-10-15 PROCEDURE — 99214 OFFICE O/P EST MOD 30 MIN: CPT

## 2019-10-15 PROCEDURE — 93000 ELECTROCARDIOGRAM COMPLETE: CPT

## 2019-10-25 NOTE — ED ADULT NURSE REASSESSMENT NOTE - GENERAL PATIENT STATE
comfortable appearance
comfortable appearance
Note Text (......Xxx Chief Complaint.): This diagnosis correlates with the
Detail Level: Simple
Other (Free Text): Treated I&D

## 2019-10-28 ENCOUNTER — OUTPATIENT (OUTPATIENT)
Dept: OUTPATIENT SERVICES | Facility: HOSPITAL | Age: 39
LOS: 1 days | End: 2019-10-28
Payer: COMMERCIAL

## 2019-10-28 VITALS
WEIGHT: 164.91 LBS | HEART RATE: 65 BPM | HEIGHT: 67 IN | SYSTOLIC BLOOD PRESSURE: 133 MMHG | DIASTOLIC BLOOD PRESSURE: 70 MMHG | RESPIRATION RATE: 14 BRPM | OXYGEN SATURATION: 100 % | TEMPERATURE: 99 F

## 2019-10-28 DIAGNOSIS — Q24.8 OTHER SPECIFIED CONGENITAL MALFORMATIONS OF HEART: ICD-10-CM

## 2019-10-28 DIAGNOSIS — Q21.1 ATRIAL SEPTAL DEFECT: Chronic | ICD-10-CM

## 2019-10-28 DIAGNOSIS — Z98.52 VASECTOMY STATUS: Chronic | ICD-10-CM

## 2019-10-28 PROCEDURE — 93005 ELECTROCARDIOGRAM TRACING: CPT

## 2019-10-28 PROCEDURE — 33286 RMVL SUBQ CAR RHYTHM MNTR: CPT | Mod: 59

## 2019-10-28 PROCEDURE — 33286 RMVL SUBQ CAR RHYTHM MNTR: CPT

## 2019-10-28 PROCEDURE — 93010 ELECTROCARDIOGRAM REPORT: CPT

## 2019-10-28 NOTE — H&P CARDIOLOGY - HISTORY OF PRESENT ILLNESS
39 yo white male with PMHx with diet controlled HLD, cryptogenic TIA in 2/2017 treated with TPA, s/p PFO and Loop implant presents today for Loop explant.     < from: Transesophageal Echocardiogram (08.18.17 @ 12:10) >  1. Normal left atrium.  LA volume index = 27 cc/m2. No left atrial or left atrial appendage thrombus. Normal left atrial appendage function (velocity= (101) cm/s).  2. Normal left ventricular systolic function. No segmental wall motion abnormalities.  3. Normal diastolic function  4. Normal right ventricular size and function.  5. Agitated saline injection and color flow Doppler demonstrates evidence of a very small patent foramen ovale. < end of copied text > 37 yo white male with PMHx with diet controlled HLD, cryptogenic TIA in 2/2017 treated with TPA, s/p PFO  closure and Loop implant presents today for Loop explant. Denies chest pain, SOB, palpitation dizziness syncope.    < from: Transesophageal Echocardiogram (08.18.17 @ 12:10) >  1. Normal left atrium.  LA volume index = 27 cc/m2. No left atrial or left atrial appendage thrombus. Normal left atrial appendage function (velocity= (101) cm/s).  2. Normal left ventricular systolic function. No segmental wall motion abnormalities.  3. Normal diastolic function  4. Normal right ventricular size and function.  5. Agitated saline injection and color flow Doppler demonstrates evidence of a very small patent foramen ovale. < end of copied text >

## 2019-11-07 ENCOUNTER — APPOINTMENT (OUTPATIENT)
Dept: CARDIOLOGY | Facility: CLINIC | Age: 39
End: 2019-11-07
Payer: COMMERCIAL

## 2019-11-07 PROCEDURE — 93298 REM INTERROG DEV EVAL SCRMS: CPT

## 2019-11-07 PROCEDURE — 93299: CPT

## 2019-11-13 ENCOUNTER — APPOINTMENT (OUTPATIENT)
Dept: ELECTROPHYSIOLOGY | Facility: CLINIC | Age: 39
End: 2019-11-13
Payer: COMMERCIAL

## 2019-11-13 VITALS
DIASTOLIC BLOOD PRESSURE: 77 MMHG | WEIGHT: 165 LBS | HEIGHT: 67 IN | HEART RATE: 84 BPM | BODY MASS INDEX: 25.9 KG/M2 | SYSTOLIC BLOOD PRESSURE: 131 MMHG | OXYGEN SATURATION: 97 %

## 2019-11-13 PROCEDURE — 99211 OFF/OP EST MAY X REQ PHY/QHP: CPT

## 2019-11-13 NOTE — PHYSICAL EXAM
[FreeTextEntry1] : ILR explant site healed, WDL.  No erythema, hematoma, ecchymosis, or drainage noted.

## 2020-02-08 ENCOUNTER — APPOINTMENT (OUTPATIENT)
Dept: CARDIOLOGY | Facility: CLINIC | Age: 40
End: 2020-02-08

## 2020-02-09 NOTE — PATIENT PROFILE ADULT - DO YOU FEEL LIKE HURTING YOURSELF OR OTHERS?
GEN APPEARANCE: WDWN, alert and cooperative, non-toxic appearing and in NAD  HEAD: Atraumatic, normocephalic   EYES: PERRLa, EOMI, vision grossly intact.   EARS: Gross hearing intact.   NOSE: No nasal discharge, no external evidence of epistaxis.   THROAT: MMM. Oral cavity and pharynx normal. No inflammation, no swelling, no exudate, no oral lesions.  NECK: Supple  CV: RRR, S1S2, no c/r/m/g. No cyanosis or pallor. Extremities warm, well perfused. Cap refill <2 seconds. No bruits.   LUNGS: CTAB. No wheezing. No rales. No rhonchi. No diminished breath sounds.   ABDOMEN: Soft, NTND. No guarding or rebound. No masses.   MSK: Spine appears normal, no spine point tenderness. No CVA ttp. No joint erythema or tenderness. Normal muscular development. Pelvis stable.  EXTREMITIES: No peripheral edema. No obvious joint or bony deformity.  NEURO: Alert, follows commands. Weight bearing normal. Speech normal. Sensation and motor normal x4 extremities.   SKIN: Normal color for race, warm, dry and intact. No evidence of rash.  PSYCH: Normal mood and affect.
no

## 2020-06-16 ENCOUNTER — FORM ENCOUNTER (OUTPATIENT)
Age: 40
End: 2020-06-16

## 2020-06-28 ENCOUNTER — EMERGENCY (EMERGENCY)
Facility: HOSPITAL | Age: 40
LOS: 1 days | Discharge: HOME | End: 2020-06-28
Attending: EMERGENCY MEDICINE | Admitting: EMERGENCY MEDICINE
Payer: COMMERCIAL

## 2020-06-28 VITALS
TEMPERATURE: 99 F | RESPIRATION RATE: 16 BRPM | HEART RATE: 59 BPM | DIASTOLIC BLOOD PRESSURE: 80 MMHG | OXYGEN SATURATION: 100 % | SYSTOLIC BLOOD PRESSURE: 118 MMHG

## 2020-06-28 VITALS
DIASTOLIC BLOOD PRESSURE: 72 MMHG | OXYGEN SATURATION: 97 % | SYSTOLIC BLOOD PRESSURE: 109 MMHG | WEIGHT: 169.98 LBS | RESPIRATION RATE: 18 BRPM | TEMPERATURE: 98 F | HEART RATE: 69 BPM

## 2020-06-28 DIAGNOSIS — Z98.52 VASECTOMY STATUS: Chronic | ICD-10-CM

## 2020-06-28 DIAGNOSIS — Q21.1 ATRIAL SEPTAL DEFECT: Chronic | ICD-10-CM

## 2020-06-28 LAB
ALBUMIN SERPL ELPH-MCNC: 4.1 G/DL — SIGNIFICANT CHANGE UP (ref 3.3–5)
ALP SERPL-CCNC: 68 U/L — SIGNIFICANT CHANGE UP (ref 40–120)
ALT FLD-CCNC: 31 U/L — SIGNIFICANT CHANGE UP (ref 12–78)
ANION GAP SERPL CALC-SCNC: 5 MMOL/L — SIGNIFICANT CHANGE UP (ref 5–17)
AST SERPL-CCNC: 22 U/L — SIGNIFICANT CHANGE UP (ref 15–37)
BASOPHILS # BLD AUTO: 0.03 K/UL — SIGNIFICANT CHANGE UP (ref 0–0.2)
BASOPHILS NFR BLD AUTO: 0.5 % — SIGNIFICANT CHANGE UP (ref 0–2)
BILIRUB SERPL-MCNC: 0.6 MG/DL — SIGNIFICANT CHANGE UP (ref 0.2–1.2)
BUN SERPL-MCNC: 14 MG/DL — SIGNIFICANT CHANGE UP (ref 7–23)
CALCIUM SERPL-MCNC: 8.8 MG/DL — SIGNIFICANT CHANGE UP (ref 8.5–10.1)
CHLORIDE SERPL-SCNC: 103 MMOL/L — SIGNIFICANT CHANGE UP (ref 96–108)
CK MB BLD-MCNC: 1 % — SIGNIFICANT CHANGE UP (ref 0–3.5)
CK MB CFR SERPL CALC: 1.7 NG/ML — SIGNIFICANT CHANGE UP (ref 0–3.6)
CK SERPL-CCNC: 164 U/L — SIGNIFICANT CHANGE UP (ref 26–308)
CO2 SERPL-SCNC: 29 MMOL/L — SIGNIFICANT CHANGE UP (ref 22–31)
CREAT SERPL-MCNC: 1 MG/DL — SIGNIFICANT CHANGE UP (ref 0.5–1.3)
EOSINOPHIL # BLD AUTO: 0.09 K/UL — SIGNIFICANT CHANGE UP (ref 0–0.5)
EOSINOPHIL NFR BLD AUTO: 1.4 % — SIGNIFICANT CHANGE UP (ref 0–6)
GLUCOSE SERPL-MCNC: 72 MG/DL — SIGNIFICANT CHANGE UP (ref 70–99)
HCT VFR BLD CALC: 41.6 % — SIGNIFICANT CHANGE UP (ref 39–50)
HGB BLD-MCNC: 14.6 G/DL — SIGNIFICANT CHANGE UP (ref 13–17)
IMM GRANULOCYTES NFR BLD AUTO: 0.3 % — SIGNIFICANT CHANGE UP (ref 0–1.5)
LYMPHOCYTES # BLD AUTO: 1.3 K/UL — SIGNIFICANT CHANGE UP (ref 1–3.3)
LYMPHOCYTES # BLD AUTO: 20.2 % — SIGNIFICANT CHANGE UP (ref 13–44)
MAGNESIUM SERPL-MCNC: 2.2 MG/DL — SIGNIFICANT CHANGE UP (ref 1.6–2.6)
MCHC RBC-ENTMCNC: 31.2 PG — SIGNIFICANT CHANGE UP (ref 27–34)
MCHC RBC-ENTMCNC: 35.1 GM/DL — SIGNIFICANT CHANGE UP (ref 32–36)
MCV RBC AUTO: 88.9 FL — SIGNIFICANT CHANGE UP (ref 80–100)
MONOCYTES # BLD AUTO: 0.52 K/UL — SIGNIFICANT CHANGE UP (ref 0–0.9)
MONOCYTES NFR BLD AUTO: 8.1 % — SIGNIFICANT CHANGE UP (ref 2–14)
NEUTROPHILS # BLD AUTO: 4.47 K/UL — SIGNIFICANT CHANGE UP (ref 1.8–7.4)
NEUTROPHILS NFR BLD AUTO: 69.5 % — SIGNIFICANT CHANGE UP (ref 43–77)
NRBC # BLD: 0 /100 WBCS — SIGNIFICANT CHANGE UP (ref 0–0)
PLATELET # BLD AUTO: 175 K/UL — SIGNIFICANT CHANGE UP (ref 150–400)
POTASSIUM SERPL-MCNC: 4 MMOL/L — SIGNIFICANT CHANGE UP (ref 3.5–5.3)
POTASSIUM SERPL-SCNC: 4 MMOL/L — SIGNIFICANT CHANGE UP (ref 3.5–5.3)
PROT SERPL-MCNC: 6.9 G/DL — SIGNIFICANT CHANGE UP (ref 6–8.3)
RBC # BLD: 4.68 M/UL — SIGNIFICANT CHANGE UP (ref 4.2–5.8)
RBC # FLD: 12 % — SIGNIFICANT CHANGE UP (ref 10.3–14.5)
SODIUM SERPL-SCNC: 137 MMOL/L — SIGNIFICANT CHANGE UP (ref 135–145)
TROPONIN I SERPL-MCNC: <.015 NG/ML — SIGNIFICANT CHANGE UP (ref 0.01–0.04)
WBC # BLD: 6.43 K/UL — SIGNIFICANT CHANGE UP (ref 3.8–10.5)
WBC # FLD AUTO: 6.43 K/UL — SIGNIFICANT CHANGE UP (ref 3.8–10.5)

## 2020-06-28 PROCEDURE — 71045 X-RAY EXAM CHEST 1 VIEW: CPT

## 2020-06-28 PROCEDURE — 99284 EMERGENCY DEPT VISIT MOD MDM: CPT

## 2020-06-28 PROCEDURE — 83735 ASSAY OF MAGNESIUM: CPT

## 2020-06-28 PROCEDURE — 85027 COMPLETE CBC AUTOMATED: CPT

## 2020-06-28 PROCEDURE — 82550 ASSAY OF CK (CPK): CPT

## 2020-06-28 PROCEDURE — 99285 EMERGENCY DEPT VISIT HI MDM: CPT | Mod: 25

## 2020-06-28 PROCEDURE — 36415 COLL VENOUS BLD VENIPUNCTURE: CPT

## 2020-06-28 PROCEDURE — 84484 ASSAY OF TROPONIN QUANT: CPT

## 2020-06-28 PROCEDURE — 71045 X-RAY EXAM CHEST 1 VIEW: CPT | Mod: 26

## 2020-06-28 PROCEDURE — 80053 COMPREHEN METABOLIC PANEL: CPT

## 2020-06-28 PROCEDURE — 82553 CREATINE MB FRACTION: CPT

## 2020-06-28 PROCEDURE — 93010 ELECTROCARDIOGRAM REPORT: CPT

## 2020-06-28 PROCEDURE — 93005 ELECTROCARDIOGRAM TRACING: CPT

## 2020-06-28 RX ORDER — SODIUM CHLORIDE 9 MG/ML
1000 INJECTION INTRAMUSCULAR; INTRAVENOUS; SUBCUTANEOUS ONCE
Refills: 0 | Status: COMPLETED | OUTPATIENT
Start: 2020-06-28 | End: 2020-06-28

## 2020-06-28 RX ADMIN — SODIUM CHLORIDE 1000 MILLILITER(S): 9 INJECTION INTRAMUSCULAR; INTRAVENOUS; SUBCUTANEOUS at 10:01

## 2020-06-28 NOTE — ED PROVIDER NOTE - OBJECTIVE STATEMENT
40 yo male with h/o TIA s/p TPA, PFO (s/p repair) presents to the ED s/p syncope episode this morning around 8:30 am. Patient explains he was playing flag football and collided with another player (player 6ft, 200+ lbs), impact to anterior chest. Patient states he sat down on the bench after then passed out. Another player caught him and lower him to the ground. no head trauma. Patient takes a baby aspirin. Patient states he feels slightly foggy and his body feels achy now otherwise no complains. Denies headache, dizziness, visual changes, chest pain, sob, abd pain, N/V, UE/LE weakness or paresthesias.     cardiologist: Dr. Phelan 38 yo male with h/o TIA s/p TPA, PFO (s/p repair) presents to the ED s/p syncope episode this morning around 8:30 am. Patient explains he was playing flag football and collided with another player (player 6ft, 200+ lbs), impact to anterior chest. Patient states he sat down on the bench after then passed out. Another player caught him and lower him to the ground. no head trauma. Patient takes a baby aspirin. Patient states he feels slightly foggy and his body feels achy now otherwise no complains. Denies headache, dizziness, visual changes, chest pain, sob, abd pain, N/V, UE/LE weakness or paresthesias.     pmd: Dr. Richard Alamo, cardiologist: Dr. Phelan

## 2020-06-28 NOTE — ED PROVIDER NOTE - PROGRESS NOTE DETAILS
Patient evaluated by cardiologist Dr. Wilson, cleared for discharge, follow up outpatient cardiologist Dr. Phelan

## 2020-06-28 NOTE — ED ADULT NURSE NOTE - OBJECTIVE STATEMENT
Received pt in bed alert and oriented x4.  C/O feeling dizzy after playing football and getting hit in the chest by another player today.  Ekg completed.  Ongoing nursing care and safety maintained. Received pt in bed alert and oriented x4.  C/O feeling dizzy after playing football and getting hit in the chest by another player today.  Pt stated he had a LOC.  Pt hx of stroke and PFO.  Ekg completed.  Ongoing nursing care and safety maintained.

## 2020-06-28 NOTE — CONSULT NOTE ADULT - SUBJECTIVE AND OBJECTIVE BOX
· Subjective and Objective:   [facility] CARDIOLOGY CONSULTANTS:    Morenita Elder, Waleska Phelan, Arslan Kidd [place], [place]      [phone]    CHIEF COMPLAINT: Patient is a 39y old  Male who presents with a chief complaint of passing out    Pt is a 40 y/o M with PMH TIA, PFO s/p closure who present to ER today after syncopal episode.  He states that he was playing football this morning and collided with his teammate who is taller and weighs more then him.  He hit the front of his body, anterior chest - he denies hitting head and remembers entire event.  He walked off the field, sat down and then passed out for a few seconds, witnessed by team members.  He felt well after, denies CP, SOB, palpitations, visual changes, neuro defects.    In ED:  received 1L saline bolus  ECG shows NSR without ST-T wave abnormalities or ectopy  VSS  labs unremarkable including trop   CXR normal    ROS otherwise negative unless noted        PAST MEDICAL & SURGICAL HISTORY:  PFO (patent foramen ovale)  HLD (hyperlipidemia)  TIA (transient ischemic attack)  PFO (patent foramen ovale)  H/O: vasectomy      MEDICATIONS  (STANDING):      Allergies    No Known Allergies    Intolerances                              14.6   6.43  )-----------( 175      ( 28 Jun 2020 10:11 )             41.6       06-28    137  |  103  |  14  ----------------------------<  72  4.0   |  29  |  1.00    Ca    8.8      28 Jun 2020 10:11  Mg     2.2     06-28    TPro  6.9  /  Alb  4.1  /  TBili  0.6  /  DBili  x   /  AST  22  /  ALT  31  /  AlkPhos  68  06-28      LIVER FUNCTIONS - ( 28 Jun 2020 10:11 )  Alb: 4.1 g/dL / Pro: 6.9 g/dL / ALK PHOS: 68 U/L / ALT: 31 U/L / AST: 22 U/L / GGT: x                 CARDIAC MARKERS ( 28 Jun 2020 10:11 )  <.015 ng/mL / x     / 164 U/L / x     / 1.7 ng/mL                    Daily     Daily     I&O's Summary      Vital Signs Last 24 Hrs  T(C): 36.9 (28 Jun 2020 09:25), Max: 36.9 (28 Jun 2020 09:25)  T(F): 98.4 (28 Jun 2020 09:25), Max: 98.4 (28 Jun 2020 09:25)  HR: 69 (28 Jun 2020 09:25) (69 - 69)  BP: 109/72 (28 Jun 2020 09:25) (109/72 - 109/72)  BP(mean): --  RR: 18 (28 Jun 2020 09:25) (18 - 18)  SpO2: 97% (28 Jun 2020 09:25) (97% - 97%)    PHYSICAL EXAM:   · Constitutional	Well-developed, well nourished  · Eyes	EOMI; PERRL; no drainage or redness  · ENMT	No oral lesions; no gross abnormalities  · Neck	No bruits; no thyromegaly or nodules  · Respiratory	Normal breath sounds b/l, No RRW  · Cardiovascular	Regular rate & rhythm, normal S1, S2; no murmurs, gallops or rubs; no S3, S4  · Gastrointestinal	Soft, non-tender, no hepatosplenomegaly, normal bowel sounds  · Extremities	No cyanosis, clubbing or edema  · Vascular	Equal and normal pulses (carotid, femoral, dorsalis pedis)  · Neurological	Alert & oriented; no sensory, motor or coordination deficits, normal reflexes

## 2020-06-28 NOTE — ED PROVIDER NOTE - PATIENT PORTAL LINK FT
You can access the FollowMyHealth Patient Portal offered by [facility] by registering at the following website: [email or web address]. By joining [employer]’s FollowMyHealth portal, you will also be able to view your health information using other applications (apps) compatible with our system.

## 2020-06-28 NOTE — ED PROVIDER NOTE - CARE PROVIDER_API CALL
Jef Phelan  CARDIOVASCULAR DISEASE  [address] [place] [address]  Phone: [phone]  Fax: [phone]  Follow Up Time:

## 2020-06-28 NOTE — ED PROVIDER NOTE - ATTENDING CONTRIBUTION TO CARE
I have personally performed a face to face diagnostic evaluation on this patient.  I have reviewed the PA note and agree with the history, exam, and plan of care, except as noted.  History and Exam by me shows patient brought in by wife for evaluation of syncope, patient states he was playing touch football and collided with another large player, states he felt winded and stopped playing and was walking around drinking water, then sat down, states he felt like he was having a dream and passed out, nearby teammate was able to lower him to the ground and patient awoke, patient states currently he feels well, no chest pain, no shortness of breath, heart and lungs clear, abdomen soft, no pedal edema, patient has prior history of TIA/CVA s/p TPA and closure of PFO, currently only on aspirin 81mg, f/u labs, ekg, orthostatics, cardio consult, iv fluids, re-eval.

## 2020-06-28 NOTE — ED PROVIDER NOTE - MUSCULOSKELETAL, MLM
Spine appears normal, range of motion is not limited, no muscle or joint tenderness. no chest wall or sternal tenderness. no vascular compromise.

## 2020-06-28 NOTE — CONSULT NOTE ADULT - ASSESSMENT
Pt is a 40 y/o M with PMH TIA, PFO s/p closure who present to ER today after syncopal episode.  He states that he was playing football this morning and collided with his teammate who is taller and weighs more then him.  He hit the front of his body, anterior chest - he denies hitting head and remembers entire event.  He walked off the field, sat down and then passed out for a few seconds, witnessed by team members.  He felt well after, denies CP, SOB, palpitations, visual changes, neuro defects.      Episode likely related to the shock of impact/injury  No s/s ischemia, arrythmia at this point  ECG shows NSR without ST-T wave abnormalities or ectopy  VSS  Pt stable from cardiac standpoint for discharge  I have advised him to follow up with Dr Phelan as an outpt    Thank you for this consult!

## 2020-06-28 NOTE — ED PROVIDER NOTE - CLINICAL SUMMARY MEDICAL DECISION MAKING FREE TEXT BOX
38 yo male with h/o TIA s/p TPA, PFO (s/p repair) presents to the ED s/p syncope episode this morning around 8:30 am. Patient explains he was playing flag football and collided with another player (player 6ft, 200+ lbs), impact to anterior chest. Patient states he sat down on the bench after then passed out. Another player caught him and lower him to the ground. no head trauma. Patient takes a baby aspirin. Patient states he feels slightly foggy and his body feels achy now otherwise no complains. Denies headache, dizziness, visual changes, chest pain, sob, abd pain, N/V, UE/LE weakness or paresthesias. A/P: ekg, labs, fluids, cxr, cards eval

## 2020-07-06 ENCOUNTER — APPOINTMENT (OUTPATIENT)
Dept: CARDIOLOGY | Facility: CLINIC | Age: 40
End: 2020-07-06
Payer: COMMERCIAL

## 2020-07-06 ENCOUNTER — NON-APPOINTMENT (OUTPATIENT)
Age: 40
End: 2020-07-06

## 2020-07-06 ENCOUNTER — APPOINTMENT (OUTPATIENT)
Dept: ELECTROPHYSIOLOGY | Facility: CLINIC | Age: 40
End: 2020-07-06
Payer: COMMERCIAL

## 2020-07-06 VITALS
HEIGHT: 67 IN | TEMPERATURE: 97.9 F | OXYGEN SATURATION: 99 % | SYSTOLIC BLOOD PRESSURE: 116 MMHG | DIASTOLIC BLOOD PRESSURE: 68 MMHG | WEIGHT: 173 LBS | BODY MASS INDEX: 27.15 KG/M2 | HEART RATE: 67 BPM

## 2020-07-06 DIAGNOSIS — R00.2 PALPITATIONS: ICD-10-CM

## 2020-07-06 PROCEDURE — 93000 ELECTROCARDIOGRAM COMPLETE: CPT

## 2020-07-06 PROCEDURE — 93225 XTRNL ECG REC<48 HRS REC: CPT | Mod: 59

## 2020-07-06 PROCEDURE — 99215 OFFICE O/P EST HI 40 MIN: CPT

## 2020-07-06 RX ORDER — CLOPIDOGREL BISULFATE 75 MG/1
75 TABLET, FILM COATED ORAL
Refills: 0 | Status: DISCONTINUED | COMMUNITY
End: 2020-07-06

## 2020-07-06 NOTE — PHYSICAL EXAM
[General Appearance - Well Developed] : well developed [Normal Appearance] : normal appearance [Well Groomed] : well groomed [General Appearance - Well Nourished] : well nourished [No Deformities] : no deformities [General Appearance - In No Acute Distress] : no acute distress [Normal Conjunctiva] : the conjunctiva exhibited no abnormalities [Eyelids - No Xanthelasma] : the eyelids demonstrated no xanthelasmas [No Oral Pallor] : no oral pallor [No Oral Cyanosis] : no oral cyanosis [Normal Oral Mucosa] : normal oral mucosa [Normal Jugular Venous V Waves Present] : normal jugular venous V waves present [Normal Jugular Venous A Waves Present] : normal jugular venous A waves present [No Jugular Venous Heller A Waves] : no jugular venous heller A waves [Respiration, Rhythm And Depth] : normal respiratory rhythm and effort [Exaggerated Use Of Accessory Muscles For Inspiration] : no accessory muscle use [Auscultation Breath Sounds / Voice Sounds] : lungs were clear to auscultation bilaterally [Heart Rate And Rhythm] : heart rate and rhythm were normal [Heart Sounds] : normal S1 and S2 [Murmurs] : no murmurs present [Abdomen Soft] : soft [Abdomen Tenderness] : non-tender [Abdomen Mass (___ Cm)] : no abdominal mass palpated [Abnormal Walk] : normal gait [Gait - Sufficient For Exercise Testing] : the gait was sufficient for exercise testing [Nail Clubbing] : no clubbing of the fingernails [Petechial Hemorrhages (___cm)] : no petechial hemorrhages [Cyanosis, Localized] : no localized cyanosis [Skin Color & Pigmentation] : normal skin color and pigmentation [] : no rash [Skin Lesions] : no skin lesions [No Venous Stasis] : no venous stasis [No Skin Ulcers] : no skin ulcer [No Xanthoma] : no  xanthoma was observed [Oriented To Time, Place, And Person] : oriented to person, place, and time [Affect] : the affect was normal [Mood] : the mood was normal [No Anxiety] : not feeling anxious

## 2020-07-08 DIAGNOSIS — R06.02 SHORTNESS OF BREATH: ICD-10-CM

## 2020-07-08 NOTE — DISCUSSION/SUMMARY
[FreeTextEntry1] : Patient s/p PFO closure 1.5 years ago.  Palpitations and syncope with a remote history of recurrent vasovagal syncope.  72 hour Holter started.  Echocardiogram and exercise test scheduled.

## 2020-07-08 NOTE — HISTORY OF PRESENT ILLNESS
[FreeTextEntry1] : Healthy 39 year old male.  S/P PFO closure for TIA in 1/2019.  He recently started playing sports again.  He had a collision on the field followed by a syncopal episode.  He was seen in ED with a negative workup.  He also has noted an exaggerated tachycardia and dyspnea when playing sports.  He has a remote history of recurrent vasovagal syncope.

## 2020-07-14 ENCOUNTER — TRANSCRIPTION ENCOUNTER (OUTPATIENT)
Age: 40
End: 2020-07-14

## 2020-07-16 ENCOUNTER — FORM ENCOUNTER (OUTPATIENT)
Age: 40
End: 2020-07-16

## 2020-07-16 ENCOUNTER — APPOINTMENT (OUTPATIENT)
Dept: ELECTROPHYSIOLOGY | Facility: CLINIC | Age: 40
End: 2020-07-16
Payer: COMMERCIAL

## 2020-07-16 PROCEDURE — 93227 XTRNL ECG REC<48 HR R&I: CPT

## 2020-07-17 ENCOUNTER — OUTPATIENT (OUTPATIENT)
Dept: OUTPATIENT SERVICES | Facility: HOSPITAL | Age: 40
LOS: 1 days | End: 2020-07-17
Payer: COMMERCIAL

## 2020-07-17 DIAGNOSIS — Q21.1 ATRIAL SEPTAL DEFECT: Chronic | ICD-10-CM

## 2020-07-17 DIAGNOSIS — R00.2 PALPITATIONS: ICD-10-CM

## 2020-07-17 DIAGNOSIS — Z98.52 VASECTOMY STATUS: Chronic | ICD-10-CM

## 2020-07-17 PROCEDURE — 93306 TTE W/DOPPLER COMPLETE: CPT

## 2020-07-18 DIAGNOSIS — R00.2 PALPITATIONS: ICD-10-CM

## 2020-07-28 ENCOUNTER — OUTPATIENT (OUTPATIENT)
Dept: OUTPATIENT SERVICES | Facility: HOSPITAL | Age: 40
LOS: 1 days | End: 2020-07-28
Payer: COMMERCIAL

## 2020-07-28 DIAGNOSIS — Q21.1 ATRIAL SEPTAL DEFECT: Chronic | ICD-10-CM

## 2020-07-28 DIAGNOSIS — R55 SYNCOPE AND COLLAPSE: ICD-10-CM

## 2020-07-28 DIAGNOSIS — Z98.52 VASECTOMY STATUS: Chronic | ICD-10-CM

## 2020-07-28 PROCEDURE — 93018 CV STRESS TEST I&R ONLY: CPT

## 2020-07-28 PROCEDURE — 93017 CV STRESS TEST TRACING ONLY: CPT

## 2020-07-28 PROCEDURE — 93016 CV STRESS TEST SUPVJ ONLY: CPT

## 2020-07-29 DIAGNOSIS — R55 SYNCOPE AND COLLAPSE: ICD-10-CM

## 2021-01-19 ENCOUNTER — APPOINTMENT (OUTPATIENT)
Dept: SURGERY | Facility: CLINIC | Age: 41
End: 2021-01-19
Payer: COMMERCIAL

## 2021-01-19 VITALS
SYSTOLIC BLOOD PRESSURE: 118 MMHG | HEIGHT: 67 IN | WEIGHT: 170 LBS | OXYGEN SATURATION: 99 % | BODY MASS INDEX: 26.68 KG/M2 | DIASTOLIC BLOOD PRESSURE: 79 MMHG | TEMPERATURE: 97.2 F | HEART RATE: 57 BPM | RESPIRATION RATE: 15 BRPM

## 2021-01-19 DIAGNOSIS — K40.90 UNILATERAL INGUINAL HERNIA, W/OUT OBSTRUCTION OR GANGRENE, NOT SPECIFIED AS RECURRENT: ICD-10-CM

## 2021-01-19 DIAGNOSIS — R10.31 RIGHT LOWER QUADRANT PAIN: ICD-10-CM

## 2021-01-19 PROCEDURE — 99243 OFF/OP CNSLTJ NEW/EST LOW 30: CPT

## 2021-01-19 PROCEDURE — 99072 ADDL SUPL MATRL&STAF TM PHE: CPT

## 2021-01-19 RX ORDER — ASPIRIN 81 MG
81 TABLET, DELAYED RELEASE (ENTERIC COATED) ORAL
Refills: 0 | Status: ACTIVE | COMMUNITY

## 2021-01-19 NOTE — PHYSICAL EXAM
[Normal Breath Sounds] : Normal breath sounds [Normal Rate and Rhythm] : normal rate and rhythm [No Rash or Lesion] : No rash or lesion [Alert] : alert [Calm] : calm [de-identified] : nad [de-identified] : Small nontender reducible right inguinal hernia.  No palpable left inguinal hernia or umbilical hernia.  Cord and testes normal bilaterally. [de-identified] : nl

## 2021-01-19 NOTE — ASSESSMENT
[FreeTextEntry1] : In summary the patient has a small symptomatic reducible right inguinal hernia.  I recommended that this be electively repaired with mesh.  I explained the risks benefits and alternatives including the rare complications of bleeding infection recurrence and spermatic cord injury

## 2021-01-19 NOTE — CONSULT LETTER
[Dear  ___] : Dear  [unfilled], [Consult Letter:] : I had the pleasure of evaluating your patient, [unfilled]. [Consult Closing:] : Thank you very much for allowing me to participate in the care of this patient.  If you have any questions, please do not hesitate to contact me. [Please see my note below.] : Please see my note below. [Sincerely,] : Sincerely, [FreeTextEntry3] : Shemar Rosas M.D., F.A.C.S, F.A.S.C.R.S

## 2021-02-24 ENCOUNTER — RESULT REVIEW (OUTPATIENT)
Age: 41
End: 2021-02-24

## 2021-02-25 ENCOUNTER — NON-APPOINTMENT (OUTPATIENT)
Age: 41
End: 2021-02-25

## 2021-07-12 ENCOUNTER — APPOINTMENT (OUTPATIENT)
Dept: CARDIOLOGY | Facility: CLINIC | Age: 41
End: 2021-07-12
Payer: COMMERCIAL

## 2021-07-12 ENCOUNTER — NON-APPOINTMENT (OUTPATIENT)
Age: 41
End: 2021-07-12

## 2021-07-12 VITALS
WEIGHT: 174 LBS | SYSTOLIC BLOOD PRESSURE: 125 MMHG | RESPIRATION RATE: 16 BRPM | HEART RATE: 65 BPM | OXYGEN SATURATION: 100 % | BODY MASS INDEX: 27.31 KG/M2 | DIASTOLIC BLOOD PRESSURE: 81 MMHG | HEIGHT: 67 IN

## 2021-07-12 DIAGNOSIS — E78.5 HYPERLIPIDEMIA, UNSPECIFIED: ICD-10-CM

## 2021-07-12 DIAGNOSIS — G45.9 TRANSIENT CEREBRAL ISCHEMIC ATTACK, UNSPECIFIED: ICD-10-CM

## 2021-07-12 DIAGNOSIS — Z87.74 PERSONAL HISTORY OF (CORRECTED) CONGENITAL MALFORMATIONS OF HEART AND CIRCULATORY SYSTEM: ICD-10-CM

## 2021-07-12 DIAGNOSIS — R55 SYNCOPE AND COLLAPSE: ICD-10-CM

## 2021-07-12 PROCEDURE — 99214 OFFICE O/P EST MOD 30 MIN: CPT

## 2021-07-12 PROCEDURE — 93000 ELECTROCARDIOGRAM COMPLETE: CPT

## 2021-07-12 NOTE — DISCUSSION/SUMMARY
[FreeTextEntry1] : S/P PFO closure with no recurrent events.  Conintue aspirin.  Syncope no recurrence.  Hyperlipidemia.  Based on abnormal eye exam and LDL of 160, would restart rosuvastatin 5 mg.

## 2021-07-12 NOTE — REASON FOR VISIT
[Structural Heart and Valve Disease] : structural heart and valve disease [Hyperlipidemia] : hyperlipidemia [Other: ____] : [unfilled]

## 2021-07-12 NOTE — HISTORY OF PRESENT ILLNESS
[FreeTextEntry1] : Patient with TIA, s/p PFO closure 2019.  Palpitations and syncope with negative echo, stress and event monitor 2020.  No recurrent syncope or neurological event.  No chest discomfort or BAPTISTE. Plaque seen on eye exam, started on rosuvastatin and he stopped.

## 2021-07-15 RX ORDER — ROSUVASTATIN CALCIUM 5 MG/1
5 TABLET, FILM COATED ORAL
Qty: 30 | Refills: 0 | Status: ACTIVE | COMMUNITY
Start: 2021-02-11

## 2021-07-15 RX ORDER — OXYCODONE AND ACETAMINOPHEN 5; 325 MG/1; MG/1
5-325 TABLET ORAL
Qty: 20 | Refills: 0 | Status: ACTIVE | COMMUNITY
Start: 2021-02-24

## 2021-07-15 RX ORDER — MUPIROCIN 20 MG/G
2 OINTMENT TOPICAL
Qty: 22 | Refills: 0 | Status: ACTIVE | COMMUNITY
Start: 2021-03-17

## 2022-07-28 NOTE — ED ADULT NURSE NOTE - NS ED NOTE ABUSE SUSPICION NEGLECT YN
Quyen from [facility] is asking for an order to be placed for the patient to enter hospice.    Please call Quyen with questions at [phone]   No

## 2023-02-27 ENCOUNTER — APPOINTMENT (OUTPATIENT)
Dept: ORTHOPEDIC SURGERY | Facility: CLINIC | Age: 43
End: 2023-02-27
Payer: COMMERCIAL

## 2023-02-27 VITALS — HEIGHT: 67 IN | WEIGHT: 170 LBS | BODY MASS INDEX: 26.68 KG/M2

## 2023-02-27 DIAGNOSIS — M79.646 PAIN IN UNSPECIFIED FINGER(S): ICD-10-CM

## 2023-02-27 PROCEDURE — 73140 X-RAY EXAM OF FINGER(S): CPT | Mod: RT

## 2023-02-27 PROCEDURE — 99203 OFFICE O/P NEW LOW 30 MIN: CPT | Mod: 57

## 2023-02-27 PROCEDURE — 26740 TREAT FINGER FRACTURE EACH: CPT | Mod: RT

## 2023-02-27 NOTE — HISTORY OF PRESENT ILLNESS
[Sudden] : sudden [5] : 5 [1] : 2 [Dull/Aching] : dull/aching [Nothing helps with pain getting better] : Nothing helps with pain getting better [de-identified] : R SF injury about a month ago\par \par  [] : no [FreeTextEntry1] : right SF  [FreeTextEntry3] : few weeks ago  [FreeTextEntry5] : patient states he jammed his right SF playing basketball, he has pain and swelling  [de-identified] : activity

## 2023-02-27 NOTE — ASSESSMENT
[FreeTextEntry1] : I splinted the finger\par Discussed possible nonhealing\par Return in 4 weeks- xrays

## 2023-03-31 ENCOUNTER — APPOINTMENT (OUTPATIENT)
Dept: ORTHOPEDIC SURGERY | Facility: CLINIC | Age: 43
End: 2023-03-31
Payer: COMMERCIAL

## 2023-03-31 VITALS — HEIGHT: 67 IN | WEIGHT: 170 LBS | BODY MASS INDEX: 26.68 KG/M2

## 2023-03-31 DIAGNOSIS — S62.636A DISPLACED FRACTURE OF DISTAL PHALANX OF RIGHT LITTLE FINGER, INITIAL ENCOUNTER FOR CLOSED FRACTURE: ICD-10-CM

## 2023-03-31 PROCEDURE — 73140 X-RAY EXAM OF FINGER(S): CPT | Mod: RT

## 2023-03-31 PROCEDURE — 99024 POSTOP FOLLOW-UP VISIT: CPT

## 2023-03-31 NOTE — PHYSICAL EXAM
[de-identified] : R SF\par Mild tender\par Limited ext DIP\par Mild swelling\par \par Xrays healing fracture

## 2023-03-31 NOTE — HISTORY OF PRESENT ILLNESS
[5] : 5 [2] : 2 [Dull/Aching] : dull/aching [de-identified] : R SF distal phalanx fx\par Has some pain, deformity\par \par Injury 8 weeks\par Splinted for 4 weeks [FreeTextEntry1] : MARIO DANIEL  [FreeTextEntry5] : pain is the same\par  [de-identified] : splint

## 2023-04-28 ENCOUNTER — APPOINTMENT (OUTPATIENT)
Dept: ORTHOPEDIC SURGERY | Facility: CLINIC | Age: 43
End: 2023-04-28

## 2023-06-08 NOTE — ED ADULT NURSE NOTE - EXTENSIONS OF SELF_ADULT
Quality 226: Preventive Care And Screening: Tobacco Use: Screening And Cessation Intervention: Patient screened for tobacco use and is an ex/non-smoker Quality 130: Documentation Of Current Medications In The Medical Record: Current Medications Documented Quality 431: Preventive Care And Screening: Unhealthy Alcohol Use - Screening: Patient not identified as an unhealthy alcohol user when screened for unhealthy alcohol use using a systematic screening method Detail Level: Detailed None

## 2023-08-27 NOTE — ED ADULT NURSE NOTE - NURSING NEURO STROKE SYMPTOMS
Patient calm and cooperative on the unit this evening, and visible in the milieu. Patient compliant with medication administration this evening.      Patient reports that he is eating and sleeping well. Patient also reports going to groups and that they are helpful. Pt denies SI/HI/AVH/Pain.      RN provided 1:1 interaction and education based on assessment of needs.  Continue to monitor patient, continue precautions, continue to reinforce medication compliance. Continue to promote therapeutic engagement in the milieu.      expressive aphasia

## 2023-11-23 ENCOUNTER — NON-APPOINTMENT (OUTPATIENT)
Age: 43
End: 2023-11-23

## 2024-06-21 PROBLEM — Z00.00 ENCOUNTER FOR PREVENTIVE HEALTH EXAMINATION: Status: ACTIVE | Noted: 2024-06-21

## 2024-07-18 ENCOUNTER — APPOINTMENT (OUTPATIENT)
Dept: INTERNAL MEDICINE | Facility: CLINIC | Age: 44
End: 2024-07-18

## 2025-06-25 NOTE — ED PROVIDER NOTE - NEUROLOGICAL, MLM
Pt has questions and concerns regarding poss tick bite.    
Pt reports itching on L hand. There is a red [place] on her L hand. She does not recall being bit by any insect. She was doing yard work this AM.     Denies sob, cp, fever, rash, or any other symptoms.     Advised appt. Scheduled with Apolinar Nix tomorrow. Patient to return call with new or worsening symptoms. Patient verbalizes understanding and has no further questions at this time.     Reason for Disposition   Looks infected (e.g., spreading redness, red streak, pus)    Protocols used: Itching - [id number]    
Alert and oriented, no focal deficits, no motor or sensory deficits.
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
